# Patient Record
Sex: FEMALE | Race: WHITE | NOT HISPANIC OR LATINO | Employment: FULL TIME | URBAN - METROPOLITAN AREA
[De-identification: names, ages, dates, MRNs, and addresses within clinical notes are randomized per-mention and may not be internally consistent; named-entity substitution may affect disease eponyms.]

---

## 2018-03-22 ENCOUNTER — HOSPITAL ENCOUNTER (OUTPATIENT)
Dept: CARDIOLOGY | Facility: HOSPITAL | Age: 53
Discharge: HOME | End: 2018-03-22
Admitting: NURSE PRACTITIONER

## 2018-03-22 ENCOUNTER — OFFICE VISIT (OUTPATIENT)
Dept: FAMILY MEDICINE CLINIC | Facility: CLINIC | Age: 53
End: 2018-03-22

## 2018-03-22 VITALS
SYSTOLIC BLOOD PRESSURE: 112 MMHG | HEART RATE: 84 BPM | WEIGHT: 245 LBS | BODY MASS INDEX: 37.13 KG/M2 | TEMPERATURE: 97.6 F | HEIGHT: 68 IN | OXYGEN SATURATION: 98 % | DIASTOLIC BLOOD PRESSURE: 76 MMHG

## 2018-03-22 DIAGNOSIS — M79.661 RIGHT CALF PAIN: ICD-10-CM

## 2018-03-22 DIAGNOSIS — M54.31 SCIATICA OF RIGHT SIDE: Primary | ICD-10-CM

## 2018-03-22 LAB
BH CV LOWER VASCULAR LEFT COMMON FEMORAL AUGMENT: NORMAL
BH CV LOWER VASCULAR LEFT COMMON FEMORAL COMPETENT: NORMAL
BH CV LOWER VASCULAR LEFT COMMON FEMORAL COMPRESS: NORMAL
BH CV LOWER VASCULAR LEFT COMMON FEMORAL PHASIC: NORMAL
BH CV LOWER VASCULAR LEFT COMMON FEMORAL SPONT: NORMAL
BH CV LOWER VASCULAR RIGHT COMMON FEMORAL AUGMENT: NORMAL
BH CV LOWER VASCULAR RIGHT COMMON FEMORAL COMPETENT: NORMAL
BH CV LOWER VASCULAR RIGHT COMMON FEMORAL COMPRESS: NORMAL
BH CV LOWER VASCULAR RIGHT COMMON FEMORAL PHASIC: NORMAL
BH CV LOWER VASCULAR RIGHT COMMON FEMORAL SPONT: NORMAL
BH CV LOWER VASCULAR RIGHT DISTAL FEMORAL COMPRESS: NORMAL
BH CV LOWER VASCULAR RIGHT GASTRONEMIUS COMPRESS: NORMAL
BH CV LOWER VASCULAR RIGHT GREATER SAPH AK COMPRESS: NORMAL
BH CV LOWER VASCULAR RIGHT GREATER SAPH BK COMPRESS: NORMAL
BH CV LOWER VASCULAR RIGHT MID FEMORAL AUGMENT: NORMAL
BH CV LOWER VASCULAR RIGHT MID FEMORAL COMPETENT: NORMAL
BH CV LOWER VASCULAR RIGHT MID FEMORAL COMPRESS: NORMAL
BH CV LOWER VASCULAR RIGHT MID FEMORAL PHASIC: NORMAL
BH CV LOWER VASCULAR RIGHT MID FEMORAL SPONT: NORMAL
BH CV LOWER VASCULAR RIGHT PERONEAL COMPRESS: NORMAL
BH CV LOWER VASCULAR RIGHT POPLITEAL AUGMENT: NORMAL
BH CV LOWER VASCULAR RIGHT POPLITEAL COMPETENT: NORMAL
BH CV LOWER VASCULAR RIGHT POPLITEAL COMPRESS: NORMAL
BH CV LOWER VASCULAR RIGHT POPLITEAL PHASIC: NORMAL
BH CV LOWER VASCULAR RIGHT POPLITEAL SPONT: NORMAL
BH CV LOWER VASCULAR RIGHT POSTERIOR TIBIAL COMPRESS: NORMAL
BH CV LOWER VASCULAR RIGHT PROXIMAL FEMORAL COMPRESS: NORMAL
BH CV LOWER VASCULAR RIGHT SAPHENOFEMORAL JUNCTION AUGMENT: NORMAL
BH CV LOWER VASCULAR RIGHT SAPHENOFEMORAL JUNCTION COMPETENT: NORMAL
BH CV LOWER VASCULAR RIGHT SAPHENOFEMORAL JUNCTION COMPRESS: NORMAL
BH CV LOWER VASCULAR RIGHT SAPHENOFEMORAL JUNCTION PHASIC: NORMAL
BH CV LOWER VASCULAR RIGHT SAPHENOFEMORAL JUNCTION SPONT: NORMAL

## 2018-03-22 PROCEDURE — 99214 OFFICE O/P EST MOD 30 MIN: CPT | Performed by: NURSE PRACTITIONER

## 2018-03-22 PROCEDURE — 93971 EXTREMITY STUDY: CPT

## 2018-03-22 RX ORDER — METHYLPREDNISOLONE 4 MG/1
TABLET ORAL
Qty: 1 EACH | Refills: 0 | Status: SHIPPED | OUTPATIENT
Start: 2018-03-22 | End: 2019-02-11

## 2018-03-22 RX ORDER — CYCLOBENZAPRINE HCL 10 MG
10 TABLET ORAL 3 TIMES DAILY PRN
Qty: 30 TABLET | Refills: 0 | Status: SHIPPED | OUTPATIENT
Start: 2018-03-22 | End: 2019-02-11

## 2018-03-22 NOTE — PROGRESS NOTES
"Subjective   Kim Amezquita is a 52 y.o. female.     History of Present Illness   Patient presenting to the office today with right calf pain that started Tuesday night it is in the upper calf close to the back of the knee.  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  The pain has consistently gotten worse now is gone up to her right buttock she believes it could possibly be sciatica she's had sciatica once before in her life.  She's tried heat ice which haven't really helped very much.  No medications.  The following portions of the patient's history were reviewed and updated as appropriate: allergies, current medications, past social history and problem list.    Review of Systems   Musculoskeletal: Positive for back pain.   All other systems reviewed and are negative.      Objective   /76 (BP Location: Left arm, Patient Position: Sitting)   Pulse 84   Temp 97.6 °F (36.4 °C)   Ht 172.7 cm (67.99\")   Wt 111 kg (245 lb)   SpO2 98%   BMI 37.26 kg/m²   Physical Exam   Constitutional: She is oriented to person, place, and time. Vital signs are normal. She appears well-developed and well-nourished. No distress.   HENT:   Head: Normocephalic.   Cardiovascular: Normal rate, regular rhythm and normal heart sounds.    Pulmonary/Chest: Effort normal and breath sounds normal.   Neurological: She is alert and oriented to person, place, and time. Gait normal.   Psychiatric: She has a normal mood and affect. Her behavior is normal. Judgment and thought content normal.   Vitals reviewed.  On exam she does have a positive Homans sign on the right and also I do feel a rope in the back of the right upper calf.    Assessment/Plan   Problem List Items Addressed This Visit        Nervous and Auditory    Sciatica of right side - Primary    Relevant Medications    MethylPREDNISolone (MEDROL, CURTIS,) 4 MG tablet    cyclobenzaprine (FLEXERIL) 10 MG tablet    Right calf pain    Relevant Orders "    Duplex Venous Lower Extremity - Right CAR      Other Visit Diagnoses    None.     Going to go ahead and treat her for sciatica but also sending her for Doppler

## 2019-02-11 ENCOUNTER — OFFICE VISIT (OUTPATIENT)
Dept: FAMILY MEDICINE CLINIC | Facility: CLINIC | Age: 54
End: 2019-02-11

## 2019-02-11 VITALS
HEART RATE: 74 BPM | HEIGHT: 68 IN | SYSTOLIC BLOOD PRESSURE: 126 MMHG | OXYGEN SATURATION: 99 % | TEMPERATURE: 98.2 F | WEIGHT: 259.2 LBS | BODY MASS INDEX: 39.28 KG/M2 | DIASTOLIC BLOOD PRESSURE: 70 MMHG

## 2019-02-11 DIAGNOSIS — R00.0 TACHYCARDIA: ICD-10-CM

## 2019-02-11 DIAGNOSIS — R00.0 TACHYCARDIA: Primary | ICD-10-CM

## 2019-02-11 PROBLEM — M54.31 SCIATICA OF RIGHT SIDE: Status: RESOLVED | Noted: 2018-03-22 | Resolved: 2019-02-11

## 2019-02-11 PROCEDURE — 99213 OFFICE O/P EST LOW 20 MIN: CPT | Performed by: NURSE PRACTITIONER

## 2019-02-11 RX ORDER — ASPIRIN 81 MG/1
81 TABLET ORAL
COMMUNITY
Start: 2019-01-22 | End: 2019-03-05

## 2019-02-11 RX ORDER — ROPINIROLE 1 MG/1
TABLET, FILM COATED ORAL
Refills: 0 | COMMUNITY
Start: 2019-01-02 | End: 2022-04-12

## 2019-02-11 RX ORDER — METOPROLOL TARTRATE 50 MG/1
TABLET, FILM COATED ORAL
Qty: 30 TABLET | Refills: 0 | Status: SHIPPED | OUTPATIENT
Start: 2019-02-11 | End: 2019-02-11 | Stop reason: SDUPTHER

## 2019-02-11 RX ORDER — METOPROLOL TARTRATE 50 MG/1
TABLET, FILM COATED ORAL
Qty: 90 TABLET | Refills: 0 | Status: SHIPPED | OUTPATIENT
Start: 2019-02-11 | End: 2019-07-31

## 2019-02-11 RX ORDER — PROMETHAZINE HYDROCHLORIDE 12.5 MG/1
TABLET ORAL
Refills: 0 | COMMUNITY
Start: 2019-02-07 | End: 2019-07-31

## 2019-02-11 RX ORDER — OXYCODONE AND ACETAMINOPHEN 7.5; 325 MG/1; MG/1
TABLET ORAL
Refills: 0 | COMMUNITY
Start: 2019-01-31 | End: 2019-07-31

## 2019-02-11 NOTE — PROGRESS NOTES
"Subjective   Kim Amezquita is a 53 y.o. female.     History of Present Illness   Pt is going through PT for post op right knee replacement and at last three visits her pulse rate has been high. She has been having PT in her home but now is going to [facility].  Since going to their facility she has not had her pulse checked.  Monday her pulse was so high the therapist suggested she go to the ER but she refused to go.  She felt fine during all three PT sessions and did not have any SOA, palpitations, dizziness or blacking out. She does have PT this afternoon. No cough or leg pain other than knee do to surgery  The following portions of the patient's history were reviewed and updated as appropriate: allergies, current medications, past social history and problem list.    Review of Systems   Cardiovascular:        Tachycardia   All other systems reviewed and are negative.      Objective   /70 (BP Location: Left arm, Patient Position: Sitting)   Pulse 74   Temp 98.2 °F (36.8 °C)   Ht 172.7 cm (67.99\")   Wt 118 kg (259 lb 3.2 oz)   SpO2 99%   BMI 39.42 kg/m²   Physical Exam   Constitutional: She is oriented to person, place, and time. Vital signs are normal. She appears well-developed and well-nourished. No distress.   HENT:   Head: Normocephalic.   Cardiovascular: Normal rate, regular rhythm and normal heart sounds.   Pulmonary/Chest: Effort normal and breath sounds normal.   Neurological: She is alert and oriented to person, place, and time. Gait normal.   Psychiatric: She has a normal mood and affect. Her behavior is normal. Judgment and thought content normal.   Vitals reviewed.      Assessment/Plan      Diagnosis Plan   1. Tachycardia  metoprolol tartrate (LOPRESSOR) 50 MG tablet     Had been Toprol 50 mg for her to take 30 minutes prior to her physical therapy visits.  Today she's to have her pulse taken before starting physical therapy and then retirement through and his numbers are to be called to me " before she takes any medication.  Mata Lopez, APRN  2/11/2019

## 2019-02-12 ENCOUNTER — TELEPHONE (OUTPATIENT)
Dept: FAMILY MEDICINE CLINIC | Facility: CLINIC | Age: 54
End: 2019-02-12

## 2019-02-12 NOTE — TELEPHONE ENCOUNTER
Patient left message stating her heart rate before physical therapy today was 83 half way through it was 92. Last Monday before she started physical therapy her heart rate was 105. She picked up the beta blocker but has not started taking it yet

## 2019-03-07 ENCOUNTER — TRANSCRIBE ORDERS (OUTPATIENT)
Dept: PHYSICAL THERAPY | Facility: HOSPITAL | Age: 54
End: 2019-03-07

## 2019-03-07 DIAGNOSIS — Z96.651 H/O TOTAL KNEE REPLACEMENT, RIGHT: Primary | ICD-10-CM

## 2019-03-07 DIAGNOSIS — M25.562 CHRONIC PAIN OF LEFT KNEE: ICD-10-CM

## 2019-03-07 DIAGNOSIS — G89.29 CHRONIC PAIN OF LEFT KNEE: ICD-10-CM

## 2019-07-31 ENCOUNTER — OFFICE VISIT (OUTPATIENT)
Dept: FAMILY MEDICINE CLINIC | Facility: CLINIC | Age: 54
End: 2019-07-31

## 2019-07-31 VITALS
BODY MASS INDEX: 35.74 KG/M2 | HEIGHT: 68 IN | TEMPERATURE: 98.7 F | WEIGHT: 235.8 LBS | DIASTOLIC BLOOD PRESSURE: 74 MMHG | HEART RATE: 77 BPM | OXYGEN SATURATION: 99 % | SYSTOLIC BLOOD PRESSURE: 110 MMHG

## 2019-07-31 DIAGNOSIS — Z13.1 SCREENING FOR DIABETES MELLITUS: ICD-10-CM

## 2019-07-31 DIAGNOSIS — R53.83 FATIGUE, UNSPECIFIED TYPE: Primary | ICD-10-CM

## 2019-07-31 DIAGNOSIS — R06.83 SNORING: ICD-10-CM

## 2019-07-31 DIAGNOSIS — Z13.0 SCREENING FOR IRON DEFICIENCY ANEMIA: ICD-10-CM

## 2019-07-31 DIAGNOSIS — Z13.6 SCREENING FOR ISCHEMIC HEART DISEASE: ICD-10-CM

## 2019-07-31 DIAGNOSIS — Z83.49 FAMILY HISTORY OF THYROID DISEASE: ICD-10-CM

## 2019-07-31 PROCEDURE — 99214 OFFICE O/P EST MOD 30 MIN: CPT | Performed by: NURSE PRACTITIONER

## 2019-07-31 NOTE — PROGRESS NOTES
"Subjective   Kim Amezquita is a 54 y.o. female.     History of Present Illness   Pt presenting to the office today with concerns over fatigue.  She states that she is up every day between 4-5am and has been that way forever and historically has been doing ok with it until the recent last few months. She states that she sleep s very good lately and in the morning needs multiple cups of coffee to get her going which isn't normal.  Tired throughout the day and and will force herself to keep going and by night she is exhausted and ready for bed. She states that she is eating better lately and not eating fast foods.  She has been told that she snores. Did have a sleep study 6 years ago and it was normal.     The following portions of the patient's history were reviewed and updated as appropriate: allergies, current medications, past social history and problem list.    Review of Systems   Constitutional: Positive for fatigue.   All other systems reviewed and are negative.      Objective   /74 (BP Location: Left arm, Patient Position: Sitting)   Pulse 77   Temp 98.7 °F (37.1 °C)   Ht 172.7 cm (67.99\")   Wt 107 kg (235 lb 12.8 oz)   SpO2 99%   BMI 35.86 kg/m²   Physical Exam   Constitutional: She is oriented to person, place, and time. Vital signs are normal. She appears well-developed and well-nourished. No distress.   HENT:   Head: Normocephalic.   Cardiovascular: Normal rate, regular rhythm and normal heart sounds.   Pulmonary/Chest: Effort normal and breath sounds normal.   Neurological: She is alert and oriented to person, place, and time. Gait normal.   Psychiatric: She has a normal mood and affect. Her behavior is normal. Judgment and thought content normal.   Vitals reviewed.      Assessment/Plan      Diagnosis Plan   1. Fatigue, unspecified type  CBC & Differential    TSH    Vitamin B12 & Folate    Vitamin D 25 Hydroxy    Ambulatory Referral to Sleep Medicine   2. Family history of thyroid disease  " TSH   3. Screening for iron deficiency anemia  CBC & Differential   4. Screening for ischemic heart disease  Lipid Panel With LDL / HDL Ratio   5. Screening for diabetes mellitus  Comprehensive Metabolic Panel   6. Snoring  Ambulatory Referral to Sleep Medicine     Follow up after labs   Referral to sleep med  MICHAEL Koenig  7/31/2019

## 2019-08-01 LAB
25(OH)D3+25(OH)D2 SERPL-MCNC: 30.5 NG/ML (ref 30–100)
ALBUMIN SERPL-MCNC: 4.4 G/DL (ref 3.5–5.2)
ALBUMIN/GLOB SERPL: 1.6 G/DL
ALP SERPL-CCNC: 89 U/L (ref 39–117)
ALT SERPL-CCNC: 14 U/L (ref 1–33)
AST SERPL-CCNC: 15 U/L (ref 1–32)
BASOPHILS # BLD AUTO: 0.03 10*3/MM3 (ref 0–0.2)
BASOPHILS NFR BLD AUTO: 0.5 % (ref 0–1.5)
BILIRUB SERPL-MCNC: 0.3 MG/DL (ref 0.2–1.2)
BUN SERPL-MCNC: 14 MG/DL (ref 6–20)
BUN/CREAT SERPL: 20.6 (ref 7–25)
CALCIUM SERPL-MCNC: 9.2 MG/DL (ref 8.6–10.5)
CHLORIDE SERPL-SCNC: 103 MMOL/L (ref 98–107)
CHOLEST SERPL-MCNC: 192 MG/DL (ref 0–200)
CO2 SERPL-SCNC: 26.9 MMOL/L (ref 22–29)
CREAT SERPL-MCNC: 0.68 MG/DL (ref 0.57–1)
EOSINOPHIL # BLD AUTO: 0.05 10*3/MM3 (ref 0–0.4)
EOSINOPHIL NFR BLD AUTO: 0.8 % (ref 0.3–6.2)
ERYTHROCYTE [DISTWIDTH] IN BLOOD BY AUTOMATED COUNT: 13.3 % (ref 12.3–15.4)
FOLATE SERPL-MCNC: 8.29 NG/ML (ref 4.78–24.2)
GLOBULIN SER CALC-MCNC: 2.8 GM/DL
GLUCOSE SERPL-MCNC: 107 MG/DL (ref 65–99)
HCT VFR BLD AUTO: 43.7 % (ref 34–46.6)
HDLC SERPL-MCNC: 48 MG/DL (ref 40–60)
HGB BLD-MCNC: 13.4 G/DL (ref 12–15.9)
IMM GRANULOCYTES # BLD AUTO: 0.02 10*3/MM3 (ref 0–0.05)
IMM GRANULOCYTES NFR BLD AUTO: 0.3 % (ref 0–0.5)
LDLC SERPL CALC-MCNC: 118 MG/DL (ref 0–100)
LDLC/HDLC SERPL: 2.46 {RATIO}
LYMPHOCYTES # BLD AUTO: 1.49 10*3/MM3 (ref 0.7–3.1)
LYMPHOCYTES NFR BLD AUTO: 25 % (ref 19.6–45.3)
MCH RBC QN AUTO: 30.6 PG (ref 26.6–33)
MCHC RBC AUTO-ENTMCNC: 30.7 G/DL (ref 31.5–35.7)
MCV RBC AUTO: 99.8 FL (ref 79–97)
MONOCYTES # BLD AUTO: 0.39 10*3/MM3 (ref 0.1–0.9)
MONOCYTES NFR BLD AUTO: 6.5 % (ref 5–12)
NEUTROPHILS # BLD AUTO: 3.99 10*3/MM3 (ref 1.7–7)
NEUTROPHILS NFR BLD AUTO: 66.9 % (ref 42.7–76)
NRBC BLD AUTO-RTO: 0 /100 WBC (ref 0–0.2)
PLATELET # BLD AUTO: 277 10*3/MM3 (ref 140–450)
POTASSIUM SERPL-SCNC: 4.4 MMOL/L (ref 3.5–5.2)
PROT SERPL-MCNC: 7.2 G/DL (ref 6–8.5)
RBC # BLD AUTO: 4.38 10*6/MM3 (ref 3.77–5.28)
SODIUM SERPL-SCNC: 141 MMOL/L (ref 136–145)
TRIGL SERPL-MCNC: 129 MG/DL (ref 0–150)
TSH SERPL DL<=0.005 MIU/L-ACNC: 1.52 MIU/ML (ref 0.27–4.2)
VIT B12 SERPL-MCNC: 286 PG/ML (ref 211–946)
VLDLC SERPL CALC-MCNC: 25.8 MG/DL
WBC # BLD AUTO: 5.97 10*3/MM3 (ref 3.4–10.8)

## 2020-02-27 ENCOUNTER — OFFICE VISIT (OUTPATIENT)
Dept: FAMILY MEDICINE CLINIC | Facility: CLINIC | Age: 55
End: 2020-02-27

## 2020-02-27 VITALS
TEMPERATURE: 98.4 F | HEIGHT: 68 IN | BODY MASS INDEX: 37.83 KG/M2 | DIASTOLIC BLOOD PRESSURE: 84 MMHG | SYSTOLIC BLOOD PRESSURE: 158 MMHG | WEIGHT: 249.6 LBS | HEART RATE: 85 BPM | OXYGEN SATURATION: 98 %

## 2020-02-27 DIAGNOSIS — J32.9 SINUSITIS, UNSPECIFIED CHRONICITY, UNSPECIFIED LOCATION: Primary | ICD-10-CM

## 2020-02-27 PROCEDURE — 99213 OFFICE O/P EST LOW 20 MIN: CPT | Performed by: NURSE PRACTITIONER

## 2020-02-27 RX ORDER — METHYLPREDNISOLONE 4 MG/1
TABLET ORAL
COMMUNITY
Start: 2020-02-23 | End: 2020-03-03

## 2020-02-27 RX ORDER — AMOXICILLIN AND CLAVULANATE POTASSIUM 875; 125 MG/1; MG/1
TABLET, FILM COATED ORAL
COMMUNITY
Start: 2020-02-23 | End: 2020-04-16

## 2020-02-27 NOTE — PROGRESS NOTES
"Subjective   Kim Amezquita is a 54 y.o. female.     History of Present Illness   Upper Respiratory Infection: Patient complains of symptoms of a URI, possible sinusitis. Symptoms include congestion, cough, sore throat and swollen glands. Onset of symptoms was 10 days ago, unchanged since that time.   She is drinking plenty of fluids. Evaluation to date: last week. Treatment to date: antibiotics and steriods.    Patient went to the [facility] on Sunday she is on day 4 of Augmentin and she has 2 days left on a steroid pack she states that she is not any better.    The following portions of the patient's history were reviewed and updated as appropriate: allergies, current medications, past social history and problem list.    Review of Systems   Constitutional: Positive for fatigue.   HENT: Positive for congestion, ear pain, postnasal drip, rhinorrhea and sore throat.    Respiratory: Positive for cough.    All other systems reviewed and are negative.      Objective   /84 (BP Location: Right arm, Patient Position: Sitting)   Pulse 85   Temp 98.4 °F (36.9 °C)   Ht 172.7 cm (67.99\")   Wt 113 kg (249 lb 9.6 oz)   SpO2 98%   BMI 37.96 kg/m²   Physical Exam   Constitutional: She appears well-developed and well-nourished.   HENT:   Right Ear: A middle ear effusion is present.   Left Ear: A middle ear effusion is present.   Nose: Nose normal.   Mouth/Throat: Posterior oropharyngeal erythema present. Tonsils are 1+ on the right. Tonsils are 1+ on the left. No tonsillar exudate.   Cardiovascular: Normal rate and regular rhythm.   Pulmonary/Chest: Effort normal and breath sounds normal.   Lymphadenopathy:     She has cervical adenopathy.        Right cervical: Superficial cervical adenopathy present.        Left cervical: Superficial cervical adenopathy present.   Psychiatric: She has a normal mood and affect.       Assessment/Plan      Diagnosis Plan   1. Sinusitis, unspecified chronicity, unspecified location  "      I discussed with her at length that I could not give her a stronger antibiotic and she is on an appropriate one but typically we have to give it 4 full days before we see any differences.  I suggested over-the-counter medications for her to use and if she does get worse after she finishes the steroid that I could call her in another steroid she is to call the office.  Mata Lopez, APRN  2/27/2020

## 2020-03-02 ENCOUNTER — TELEPHONE (OUTPATIENT)
Dept: FAMILY MEDICINE CLINIC | Facility: CLINIC | Age: 55
End: 2020-03-02

## 2020-03-02 NOTE — TELEPHONE ENCOUNTER
Patient left message stating she seen tyler last Thursday was told if she wasn't feeling better to call and tyler would prescribe her prednisone

## 2020-03-03 RX ORDER — METHYLPREDNISOLONE 4 MG/1
TABLET ORAL
Qty: 21 TABLET | Refills: 0 | Status: SHIPPED | OUTPATIENT
Start: 2020-03-03 | End: 2020-04-16

## 2020-04-10 ENCOUNTER — OFFICE VISIT (OUTPATIENT)
Dept: FAMILY MEDICINE CLINIC | Facility: CLINIC | Age: 55
End: 2020-04-10

## 2020-04-10 DIAGNOSIS — J32.9 SINUSITIS, UNSPECIFIED CHRONICITY, UNSPECIFIED LOCATION: Primary | ICD-10-CM

## 2020-04-10 PROCEDURE — 99441 PR PHYS/QHP TELEPHONE EVALUATION 5-10 MIN: CPT | Performed by: NURSE PRACTITIONER

## 2020-04-10 RX ORDER — AZITHROMYCIN 250 MG/1
TABLET, FILM COATED ORAL
Qty: 6 TABLET | Refills: 0 | Status: SHIPPED | OUTPATIENT
Start: 2020-04-10 | End: 2020-04-16

## 2020-04-10 RX ORDER — PREDNISONE 20 MG/1
TABLET ORAL
Qty: 15 TABLET | Refills: 0 | Status: SHIPPED | OUTPATIENT
Start: 2020-04-10 | End: 2020-04-16

## 2020-04-10 NOTE — PROGRESS NOTES
You have chosen to receive care through a telephone visit today. Do you consent to use a telephone visit for your medical care today? Yes    Pt calling with concerns regarding facial pressure, congestion, ear pain and pressure, runny nose.  Right ear pain is worse than the left side.  She was on Augmentin and a Medrol Dosepak in the beginning of March that she went to the [facility] and got for the same symptoms a Medrol Dosepak did help significantly but the ear pain is back and it is much worse along with all the other sinus symptoms including upper tooth pain on the right side.    Sinusitis Z-Toby #1 as directed  Prednisone 20 mg #15 take 3 a day at the same time  She is to call back if this does not work.  Phone call lasted 10 minutes

## 2020-04-16 ENCOUNTER — OFFICE VISIT (OUTPATIENT)
Dept: FAMILY MEDICINE CLINIC | Facility: CLINIC | Age: 55
End: 2020-04-16

## 2020-04-16 DIAGNOSIS — J32.9 SINUSITIS, UNSPECIFIED CHRONICITY, UNSPECIFIED LOCATION: Primary | ICD-10-CM

## 2020-04-16 PROCEDURE — 99442 PR PHYS/QHP TELEPHONE EVALUATION 11-20 MIN: CPT | Performed by: NURSE PRACTITIONER

## 2020-04-16 NOTE — PROGRESS NOTES
You have chosen to receive care through a telephone visit. Do you consent to use a telephone visit for your medical care today? Yes    Pt has completed her zpack and her steroid. She is now having congestion and able to get some out her nose.  The pressure in her face is better but still there.  Her ears are better.  She is using flonase and zyrtec daily     Sinusitis  Cont with otc meds and add Sudafed.  Do this through the weekend and if worse or not better call Monday    Telephone call lasted 11 mins

## 2020-04-21 RX ORDER — DOXYCYCLINE HYCLATE 100 MG
100 TABLET ORAL 2 TIMES DAILY
Qty: 20 TABLET | Refills: 0 | Status: SHIPPED | OUTPATIENT
Start: 2020-04-21 | End: 2022-04-12

## 2021-03-24 ENCOUNTER — BULK ORDERING (OUTPATIENT)
Dept: CASE MANAGEMENT | Facility: OTHER | Age: 56
End: 2021-03-24

## 2021-03-24 DIAGNOSIS — Z23 IMMUNIZATION DUE: ICD-10-CM

## 2022-04-12 ENCOUNTER — OFFICE VISIT (OUTPATIENT)
Dept: FAMILY MEDICINE CLINIC | Facility: CLINIC | Age: 57
End: 2022-04-12

## 2022-04-12 VITALS
WEIGHT: 264 LBS | SYSTOLIC BLOOD PRESSURE: 124 MMHG | DIASTOLIC BLOOD PRESSURE: 86 MMHG | HEART RATE: 77 BPM | OXYGEN SATURATION: 98 % | BODY MASS INDEX: 40.01 KG/M2 | HEIGHT: 68 IN | TEMPERATURE: 97.1 F | RESPIRATION RATE: 16 BRPM

## 2022-04-12 DIAGNOSIS — J01.01 ACUTE RECURRENT MAXILLARY SINUSITIS: Primary | ICD-10-CM

## 2022-04-12 DIAGNOSIS — N89.8 VAGINAL ITCHING: ICD-10-CM

## 2022-04-12 LAB
BILIRUB BLD-MCNC: NEGATIVE MG/DL
CLARITY, POC: CLEAR
COLOR UR: YELLOW
EXPIRATION DATE: ABNORMAL
GLUCOSE UR STRIP-MCNC: NEGATIVE MG/DL
KETONES UR QL: NEGATIVE
LEUKOCYTE EST, POC: ABNORMAL
Lab: ABNORMAL
NITRITE UR-MCNC: NEGATIVE MG/ML
PH UR: 7 [PH] (ref 5–8)
PROT UR STRIP-MCNC: NEGATIVE MG/DL
RBC # UR STRIP: NEGATIVE /UL
SP GR UR: 1.02 (ref 1–1.03)
UROBILINOGEN UR QL: NORMAL

## 2022-04-12 PROCEDURE — 99214 OFFICE O/P EST MOD 30 MIN: CPT

## 2022-04-12 PROCEDURE — 81003 URINALYSIS AUTO W/O SCOPE: CPT

## 2022-04-12 RX ORDER — AMOXICILLIN AND CLAVULANATE POTASSIUM 875; 125 MG/1; MG/1
1 TABLET, FILM COATED ORAL 2 TIMES DAILY
Qty: 20 TABLET | Refills: 0 | Status: SHIPPED | OUTPATIENT
Start: 2022-04-12 | End: 2022-04-22

## 2022-04-14 LAB
A VAGINAE DNA VAG QL NAA+PROBE: NORMAL SCORE
BACTERIA UR CULT: NORMAL
BACTERIA UR CULT: NORMAL
BVAB2 DNA VAG QL NAA+PROBE: NORMAL SCORE
C ALBICANS DNA VAG QL NAA+PROBE: NEGATIVE
C GLABRATA DNA VAG QL NAA+PROBE: NEGATIVE
C TRACH DNA VAG QL NAA+PROBE: NEGATIVE
MEGA1 DNA VAG QL NAA+PROBE: NORMAL SCORE
N GONORRHOEA DNA VAG QL NAA+PROBE: NEGATIVE
T VAGINALIS DNA VAG QL NAA+PROBE: NEGATIVE

## 2023-09-15 ENCOUNTER — OFFICE VISIT (OUTPATIENT)
Dept: FAMILY MEDICINE CLINIC | Facility: CLINIC | Age: 58
End: 2023-09-15
Payer: COMMERCIAL

## 2023-09-15 VITALS
OXYGEN SATURATION: 95 % | HEART RATE: 85 BPM | TEMPERATURE: 98.4 F | BODY MASS INDEX: 41.91 KG/M2 | HEIGHT: 67 IN | WEIGHT: 267 LBS | SYSTOLIC BLOOD PRESSURE: 128 MMHG | DIASTOLIC BLOOD PRESSURE: 76 MMHG | RESPIRATION RATE: 16 BRPM

## 2023-09-15 DIAGNOSIS — Z12.31 ENCOUNTER FOR SCREENING MAMMOGRAM FOR MALIGNANT NEOPLASM OF BREAST: ICD-10-CM

## 2023-09-15 DIAGNOSIS — Z12.11 COLON CANCER SCREENING: ICD-10-CM

## 2023-09-15 DIAGNOSIS — B35.4 TINEA CORPORIS: Primary | ICD-10-CM

## 2023-09-15 PROBLEM — Z13.6 SCREENING FOR ISCHEMIC HEART DISEASE: Status: RESOLVED | Noted: 2019-07-31 | Resolved: 2023-09-15

## 2023-09-15 PROBLEM — K22.70 BARRETT'S ESOPHAGUS: Status: ACTIVE | Noted: 2023-09-15

## 2023-09-15 PROBLEM — K58.9 IBS (IRRITABLE BOWEL SYNDROME): Status: ACTIVE | Noted: 2023-09-15

## 2023-09-15 PROBLEM — M79.7 FIBROMYALGIA: Status: ACTIVE | Noted: 2018-01-01

## 2023-09-15 PROBLEM — E07.9 THYROID DYSFUNCTION: Status: ACTIVE | Noted: 2023-09-15

## 2023-09-15 PROBLEM — M54.16 RIGHT LUMBAR RADICULOPATHY: Status: ACTIVE | Noted: 2019-04-10

## 2023-09-15 NOTE — PATIENT INSTRUCTIONS
Keep sacrum clean and dry, wash with soap and water daily and dry off well; may need to wash twice a day if becomes sweaty. Recommend using otc Lamisil or equivalent twice a day prn for rash. Recommend keeping area cool, wearing breathable loose clothing.

## 2023-09-15 NOTE — PROGRESS NOTES
Subjective     Kim Amezquita is a 58 y.o.. female.     Patient here today to become established.     Patient stating she works as a  for operation for [employer].     Patient stating she has allergies, has been on injections in past.     Patient stating she last had a colonoscopy 10-12 yrs ago at [facility], and it was wnl.   Patient stating she thinks she has colon cancer history on her fathers side; but denies her father, her paternal uncles having colon cancer. Patient stating her father  of ulcer complications.     Patient stating she  has seen Dr. Barrera GYN in past, last seen ; LMP 2016; no menopausal symptoms at this time, no childbirth, one D&C.     Patient stating she sees an acupuncturist every 2 weeks    Patient c/o red and swollen area to sacrum; states she has been using Lubriderm and neosporin to area.       The following portions of the patient's history were reviewed and updated as appropriate: allergies, current medications, past family history, past medical history, past social history, past surgical history and problem list.    Past Medical History:   Diagnosis Date    Schmitz's esophagus     declines medication    Breast cyst     DDD (degenerative disc disease), cervical     Deviated septum     Disc degeneration, lumbar     Dry mouth     GERD (gastroesophageal reflux disease)     Herniated disc, cervical     C5-C6, 10/2006, Dr. Maykel Manzano    IBS (irritable bowel syndrome)     Kidney stones     Migraines     Ovarian cyst     biopsy done 2010 negative, Dr. Stacy Thomas    Restless leg syndrome     TMJ (dislocation of temporomandibular joint)        Past Surgical History:   Procedure Laterality Date    CERVICAL DISCECTOMY LAMINECTOMY DECOMPRESSION POSTERIOR FUSION WITH INSTRUMENTATION      C5-C6, Dr. Pablo Pal    DILATATION AND CURETTAGE      3/2001    HERNIA REPAIR      1969 (4years old)    MENISCECTOMY Right     10/2006, Dr. Cyrus Grimaldo     "REPLACEMENT TOTAL KNEE Right     9/2014, Dr. Guadalupe    REPLACEMENT TOTAL KNEE      2019       Review of Systems   Constitutional: Negative.    Respiratory: Negative.     Cardiovascular: Negative.    Skin:         red and swollen area to sacrum     Allergies   Allergen Reactions    Meperidine     Meperidine Hcl Other (See Comments)       Objective     Vitals:    09/15/23 1032   BP: 128/76   Pulse: 85   Resp: 16   Temp: 98.4 °F (36.9 °C)   SpO2: 95%   Weight: 121 kg (267 lb)   Height: 170.2 cm (67.01\")     Body mass index is 41.81 kg/m².    Physical Exam  Vitals reviewed.   HENT:      Head: Normocephalic.   Eyes:      Pupils: Pupils are equal, round, and reactive to light.   Cardiovascular:      Rate and Rhythm: Normal rate and regular rhythm.   Pulmonary:      Effort: Pulmonary effort is normal.      Breath sounds: Normal breath sounds.   Musculoskeletal:         General: Normal range of motion.   Skin:     General: Skin is warm and dry.      Comments: Moderate size of dark pink macular closed tissue to sacrum   Neurological:      Mental Status: She is alert and oriented to person, place, and time.   Psychiatric:         Behavior: Behavior normal.         Current Outpatient Medications:     acetaminophen (TYLENOL) 500 MG tablet, Take 1 tablet by mouth Every 6 (Six) Hours As Needed for Mild Pain., Disp: 30 tablet, Rfl: 0    psyllium (METAMUCIL) 58.6 % packet, Take 1 packet by mouth Daily., Disp: , Rfl:         Diagnoses and all orders for this visit:    1. Tinea corporis (Primary)    2. Encounter for screening mammogram for malignant neoplasm of breast  -     Mammo Screening Bilateral With CAD; Future    3. Colon cancer screening  -     Cologuard - Stool, Per Rectum; Future        Patient Instructions   Keep sacrum clean and dry, wash with soap and water daily and dry off well; may need to wash twice a day if becomes sweaty. Recommend using otc Lamisil or equivalent twice a day prn for rash. Recommend keeping area " cool, wearing breathable loose clothing.     Return if symptoms worsen or fail to improve, for fasting labs, Annual physical.

## 2023-09-20 ENCOUNTER — TRANSCRIBE ORDERS (OUTPATIENT)
Dept: ADMINISTRATIVE | Facility: HOSPITAL | Age: 58
End: 2023-09-20
Payer: COMMERCIAL

## 2023-09-20 DIAGNOSIS — Z12.31 VISIT FOR SCREENING MAMMOGRAM: Primary | ICD-10-CM

## 2023-09-27 ENCOUNTER — TELEPHONE (OUTPATIENT)
Dept: FAMILY MEDICINE CLINIC | Facility: CLINIC | Age: 58
End: 2023-09-27
Payer: COMMERCIAL

## 2023-09-27 ENCOUNTER — HOSPITAL ENCOUNTER (OUTPATIENT)
Dept: MAMMOGRAPHY | Facility: HOSPITAL | Age: 58
Discharge: HOME | End: 2023-09-27
Admitting: NURSE PRACTITIONER
Payer: COMMERCIAL

## 2023-09-27 DIAGNOSIS — Z12.31 VISIT FOR SCREENING MAMMOGRAM: ICD-10-CM

## 2023-09-27 PROCEDURE — 77063 BREAST TOMOSYNTHESIS BI: CPT

## 2023-09-27 PROCEDURE — 77067 SCR MAMMO BI INCL CAD: CPT

## 2023-09-27 NOTE — TELEPHONE ENCOUNTER
----- Message from MICHAEL Carrera sent at 9/27/2023  3:53 PM EDT -----  Please call Patient and inform her that her mammogram was negative. thanks

## 2023-09-28 DIAGNOSIS — E55.9 VITAMIN D DEFICIENCY: ICD-10-CM

## 2023-09-28 DIAGNOSIS — Z13.1 SCREENING FOR DIABETES MELLITUS: ICD-10-CM

## 2023-09-28 DIAGNOSIS — Z00.00 ANNUAL PHYSICAL EXAM: Primary | ICD-10-CM

## 2023-09-28 DIAGNOSIS — E07.9 THYROID DYSFUNCTION: ICD-10-CM

## 2023-09-29 LAB
25(OH)D3+25(OH)D2 SERPL-MCNC: 28.5 NG/ML (ref 30–100)
ALBUMIN SERPL-MCNC: 4.6 G/DL (ref 3.5–5.2)
ALBUMIN/GLOB SERPL: 1.6 G/DL
ALP SERPL-CCNC: 81 U/L (ref 39–117)
ALT SERPL-CCNC: 17 U/L (ref 1–33)
APPEARANCE UR: CLEAR
AST SERPL-CCNC: 15 U/L (ref 1–32)
BACTERIA #/AREA URNS HPF: ABNORMAL /HPF
BASOPHILS # BLD AUTO: 0.03 10*3/MM3 (ref 0–0.2)
BASOPHILS NFR BLD AUTO: 0.5 % (ref 0–1.5)
BILIRUB SERPL-MCNC: 0.3 MG/DL (ref 0–1.2)
BILIRUB UR QL STRIP: NEGATIVE
BUN SERPL-MCNC: 13 MG/DL (ref 6–20)
BUN/CREAT SERPL: 21 (ref 7–25)
CALCIUM SERPL-MCNC: 9.3 MG/DL (ref 8.6–10.5)
CASTS URNS MICRO: ABNORMAL
CHLORIDE SERPL-SCNC: 105 MMOL/L (ref 98–107)
CHOLEST SERPL-MCNC: 212 MG/DL (ref 0–200)
CO2 SERPL-SCNC: 28.1 MMOL/L (ref 22–29)
COLOR UR: YELLOW
CREAT SERPL-MCNC: 0.62 MG/DL (ref 0.57–1)
EGFRCR SERPLBLD CKD-EPI 2021: 103.4 ML/MIN/1.73
EOSINOPHIL # BLD AUTO: 0.07 10*3/MM3 (ref 0–0.4)
EOSINOPHIL NFR BLD AUTO: 1.2 % (ref 0.3–6.2)
EPI CELLS #/AREA URNS HPF: ABNORMAL /HPF
ERYTHROCYTE [DISTWIDTH] IN BLOOD BY AUTOMATED COUNT: 12.8 % (ref 12.3–15.4)
GLOBULIN SER CALC-MCNC: 2.8 GM/DL
GLUCOSE SERPL-MCNC: 104 MG/DL (ref 65–99)
GLUCOSE UR QL STRIP: NEGATIVE
HBA1C MFR BLD: 5.5 % (ref 4.8–5.6)
HCT VFR BLD AUTO: 39.7 % (ref 34–46.6)
HDLC SERPL-MCNC: 47 MG/DL (ref 40–60)
HGB BLD-MCNC: 13.5 G/DL (ref 12–15.9)
HGB UR QL STRIP: NEGATIVE
IMM GRANULOCYTES # BLD AUTO: 0.01 10*3/MM3 (ref 0–0.05)
IMM GRANULOCYTES NFR BLD AUTO: 0.2 % (ref 0–0.5)
KETONES UR QL STRIP: NEGATIVE
LDLC SERPL CALC-MCNC: 143 MG/DL (ref 0–100)
LDLC/HDLC SERPL: 3 {RATIO}
LEUKOCYTE ESTERASE UR QL STRIP: ABNORMAL
LYMPHOCYTES # BLD AUTO: 1.73 10*3/MM3 (ref 0.7–3.1)
LYMPHOCYTES NFR BLD AUTO: 29 % (ref 19.6–45.3)
MCH RBC QN AUTO: 31.5 PG (ref 26.6–33)
MCHC RBC AUTO-ENTMCNC: 34 G/DL (ref 31.5–35.7)
MCV RBC AUTO: 92.8 FL (ref 79–97)
MONOCYTES # BLD AUTO: 0.38 10*3/MM3 (ref 0.1–0.9)
MONOCYTES NFR BLD AUTO: 6.4 % (ref 5–12)
MUCOUS THREADS URNS QL MICRO: ABNORMAL /HPF
NEUTROPHILS # BLD AUTO: 3.75 10*3/MM3 (ref 1.7–7)
NEUTROPHILS NFR BLD AUTO: 62.7 % (ref 42.7–76)
NITRITE UR QL STRIP: NEGATIVE
NRBC BLD AUTO-RTO: 0 /100 WBC (ref 0–0.2)
PH UR STRIP: 6 [PH] (ref 5–8)
PLATELET # BLD AUTO: 263 10*3/MM3 (ref 140–450)
POTASSIUM SERPL-SCNC: 4.6 MMOL/L (ref 3.5–5.2)
PROT SERPL-MCNC: 7.4 G/DL (ref 6–8.5)
PROT UR QL STRIP: ABNORMAL
RBC # BLD AUTO: 4.28 10*6/MM3 (ref 3.77–5.28)
RBC #/AREA URNS HPF: ABNORMAL /HPF
SODIUM SERPL-SCNC: 141 MMOL/L (ref 136–145)
SP GR UR STRIP: 1.02 (ref 1–1.03)
TRIGL SERPL-MCNC: 120 MG/DL (ref 0–150)
TSH SERPL DL<=0.005 MIU/L-ACNC: 2.26 UIU/ML (ref 0.27–4.2)
URNS CMNT MICRO: ABNORMAL
UROBILINOGEN UR STRIP-MCNC: ABNORMAL MG/DL
VLDLC SERPL CALC-MCNC: 22 MG/DL (ref 5–40)
WBC # BLD AUTO: 5.97 10*3/MM3 (ref 3.4–10.8)
WBC #/AREA URNS HPF: ABNORMAL /HPF

## 2023-10-02 ENCOUNTER — OFFICE VISIT (OUTPATIENT)
Dept: FAMILY MEDICINE CLINIC | Facility: CLINIC | Age: 58
End: 2023-10-02
Payer: COMMERCIAL

## 2023-10-02 VITALS
RESPIRATION RATE: 16 BRPM | BODY MASS INDEX: 42.06 KG/M2 | DIASTOLIC BLOOD PRESSURE: 74 MMHG | WEIGHT: 268 LBS | SYSTOLIC BLOOD PRESSURE: 126 MMHG | TEMPERATURE: 98.8 F | HEART RATE: 80 BPM | HEIGHT: 67 IN | OXYGEN SATURATION: 96 %

## 2023-10-02 DIAGNOSIS — Z00.00 ANNUAL PHYSICAL EXAM: Primary | ICD-10-CM

## 2023-10-02 DIAGNOSIS — G47.9 SLEEP DISTURBANCE: ICD-10-CM

## 2023-10-02 DIAGNOSIS — R25.1 TREMOR: ICD-10-CM

## 2023-10-02 DIAGNOSIS — E55.9 VITAMIN D DEFICIENCY: ICD-10-CM

## 2023-10-02 DIAGNOSIS — E78.2 MIXED HYPERLIPIDEMIA: ICD-10-CM

## 2023-10-02 NOTE — PATIENT INSTRUCTIONS
Hyperlipidemia: recommend eating a heart healthy low fat diet, increase water intake, and start exercising 2-3 times a week for 30 minutes at a time. Discussed giving Patient 6 months to work on diet and then rto for fasting labs follow up by appt; if no improvement discussed atorvastatin.     Tremor: fine tremor to imelda. Hands, ordering add on lab of B12 for further eval.; discussed propranolol, will discuss further in 6 months.    Vitamin D deficiency: recommend otc Vitamin D3 1000 units with calcium daily.    Sleep disturbance: Stop all electronics 1-2 hours before sleep; Start a bedtime routine that allows for relaxation before bedtime; Recommend relaxation to be shower/bath, reading a book, doing crossword puzzle, puzzles, drawing, ect.; Recommend decreasing to eliminate caffeine in diet and no caffeine in afternoon/evening.

## 2023-10-02 NOTE — PROGRESS NOTES
"Subjective   Kim Amezquita is a 58 y.o. female. Patient is here today for   Chief Complaint   Patient presents with    Annual Exam     Lab f/u          Vitals:    10/02/23 0803   BP: 126/74   Pulse: 80   Resp: 16   Temp: 98.8 °F (37.1 °C)   SpO2: 96%   Weight: 122 kg (268 lb)   Height: 170.2 cm (67.01\")      Body mass index is 41.96 kg/m².    Class 3 Severe Obesity (BMI >=40). Obesity-related health conditions include the following: dyslipidemias. Obesity is unchanged. BMI is is above average; BMI management plan is completed. We discussed portion control and increasing exercise.     The following portions of the patient's history were reviewed and updated as appropriate: allergies, current medications, past family history, past medical history, past social history, past surgical history and problem list.    Past Medical History:   Diagnosis Date    Schmitz's esophagus     declines medication    Breast cyst     DDD (degenerative disc disease), cervical     Deviated septum     Disc degeneration, lumbar     Dry mouth     GERD (gastroesophageal reflux disease)     Herniated disc, cervical     C5-C6, 10/2006, Dr. Maykel Manzano    IBS (irritable bowel syndrome)     Kidney stones     Migraines     Ovarian cyst     biopsy done 8/2010 negative, Dr. Stacy Thomas    Restless leg syndrome     TMJ (dislocation of temporomandibular joint)       Allergies   Allergen Reactions    Meperidine     Meperidine Hcl Other (See Comments)      Social History     Socioeconomic History    Marital status: Single   Tobacco Use    Smoking status: Never    Smokeless tobacco: Never   Substance and Sexual Activity    Alcohol use: No    Drug use: No    Sexual activity: Yes     Partners: Male        Current Outpatient Medications:     acetaminophen (TYLENOL) 500 MG tablet, Take 1 tablet by mouth Every 6 (Six) Hours As Needed for Mild Pain., Disp: 30 tablet, Rfl: 0    psyllium (METAMUCIL) 58.6 % packet, Take 1 packet by mouth Daily., Disp: , Rfl: "      Last Mammogram: 23 negative    Last Cologaurd ordered on 23, awaiting processing    ECG today in office: SR, vent rate 68, ND int 180, QRS 94    Objective   History of Present Illness  Patient here today for annual physcial.    Patient stating she last had a colonoscopy 10-12 yrs ago at [facility], and it was wnl.      Patient stating she has seen Dr. Barrera GYN in past, last seen ; LMP 2016; no menopausal symptoms at this time, no childbirth, one D&C.      Patient stating she sees an acupuncturist every 2 weeks     Per Patient 3/2007 [facility], high risk for Glaucoma; 2007 Sleep study done which was negative.   Kim A Alirio 58 y.o. female who presents for an Annual Wellness Visit.  she has a history of   Patient Active Problem List   Diagnosis    Abnormal menses    Allergic rhinitis    Anxiety    FOM (frequency of micturition)    Gonalgia    Affective disorder    Abnormal findings-skull or head    Patella-femoral syndrome    Sinusitis    Right calf pain    Tachycardia    Fatigue    Family history of thyroid disease    Screening for diabetes mellitus    Snoring    Vaginal itching    Schmitz's esophagus    Fibromyalgia    Migraine    Osteoarthritis    Right lumbar radiculopathy    IBS (irritable bowel syndrome)    Thyroid dysfunction    Tremor    Mixed hyperlipidemia    Sleep disturbance    Vitamin D deficiency   .  she has been doing well with new interval problems.  Labs results discussed in detail with the patient.  Plan to update vaccines if needed today.    Health Habits:  Dental Exam. up to date; 2022  Eye Exam. up to date; 2 yrs  Exercise: 0 times/week.  Current exercise activities include: none  Diet: healthy  Water: vitamin water 2-6 bottles a day    Preventative counseling  Discussed face to face the importance of healthy diet and exercise.     PHQ-9 Depression Screenin/15/23  Little interest or pleasure in doing things?  0   Feeling down, depressed, or  hopeless?  0   Trouble falling or staying asleep, or sleeping too much?     Feeling tired or having little energy?     Poor appetite or overeating?     Feeling bad about yourself - or that you are a failure or have let yourself or your family down?     Trouble concentrating on things, such as reading the newspaper or watching television?     Moving or speaking so slowly that other people could have noticed? Or the opposite - being so fidgety or restless that you have been moving around a lot more than usual?     Thoughts that you would be better off dead, or of hurting yourself in some way?     PHQ-9 Total Score  0   If you checked off any problems, how difficult have these problems made it for you to do your work, take care of things at home, or get along with other people?          Recent Results (from the past 336 hour(s))   CBC & Differential    Collection Time: 09/28/23  8:02 AM    Specimen: Blood   Result Value Ref Range    WBC 5.97 3.40 - 10.80 10*3/mm3    RBC 4.28 3.77 - 5.28 10*6/mm3    Hemoglobin 13.5 12.0 - 15.9 g/dL    Hematocrit 39.7 34.0 - 46.6 %    MCV 92.8 79.0 - 97.0 fL    MCH 31.5 26.6 - 33.0 pg    MCHC 34.0 31.5 - 35.7 g/dL    RDW 12.8 12.3 - 15.4 %    Platelets 263 140 - 450 10*3/mm3    Neutrophil Rel % 62.7 42.7 - 76.0 %    Lymphocyte Rel % 29.0 19.6 - 45.3 %    Monocyte Rel % 6.4 5.0 - 12.0 %    Eosinophil Rel % 1.2 0.3 - 6.2 %    Basophil Rel % 0.5 0.0 - 1.5 %    Neutrophils Absolute 3.75 1.70 - 7.00 10*3/mm3    Lymphocytes Absolute 1.73 0.70 - 3.10 10*3/mm3    Monocytes Absolute 0.38 0.10 - 0.90 10*3/mm3    Eosinophils Absolute 0.07 0.00 - 0.40 10*3/mm3    Basophils Absolute 0.03 0.00 - 0.20 10*3/mm3    Immature Granulocyte Rel % 0.2 0.0 - 0.5 %    Immature Grans Absolute 0.01 0.00 - 0.05 10*3/mm3    nRBC 0.0 0.0 - 0.2 /100 WBC   Comprehensive Metabolic Panel    Collection Time: 09/28/23  8:02 AM    Specimen: Blood   Result Value Ref Range    Glucose 104 (H) 65 - 99 mg/dL    BUN 13 6 - 20  mg/dL    Creatinine 0.62 0.57 - 1.00 mg/dL    EGFR Result 103.4 >60.0 mL/min/1.73    BUN/Creatinine Ratio 21.0 7.0 - 25.0    Sodium 141 136 - 145 mmol/L    Potassium 4.6 3.5 - 5.2 mmol/L    Chloride 105 98 - 107 mmol/L    Total CO2 28.1 22.0 - 29.0 mmol/L    Calcium 9.3 8.6 - 10.5 mg/dL    Total Protein 7.4 6.0 - 8.5 g/dL    Albumin 4.6 3.5 - 5.2 g/dL    Globulin 2.8 gm/dL    A/G Ratio 1.6 g/dL    Total Bilirubin 0.3 0.0 - 1.2 mg/dL    Alkaline Phosphatase 81 39 - 117 U/L    AST (SGOT) 15 1 - 32 U/L    ALT (SGPT) 17 1 - 33 U/L   Lipid Panel With LDL / HDL Ratio    Collection Time: 09/28/23  8:02 AM    Specimen: Blood   Result Value Ref Range    Total Cholesterol 212 (H) 0 - 200 mg/dL    Triglycerides 120 0 - 150 mg/dL    HDL Cholesterol 47 40 - 60 mg/dL    VLDL Cholesterol Get 22 5 - 40 mg/dL    LDL Chol Calc (NIH) 143 (H) 0 - 100 mg/dL    LDL/HDL RATIO 3.00    TSH Rfx On Abnormal To Free T4    Collection Time: 09/28/23  8:02 AM    Specimen: Blood   Result Value Ref Range    TSH 2.260 0.270 - 4.200 uIU/mL   Vitamin D,25-Hydroxy    Collection Time: 09/28/23  8:02 AM    Specimen: Blood   Result Value Ref Range    25 Hydroxy, Vitamin D 28.5 (L) 30.0 - 100.0 ng/ml   Hemoglobin A1c    Collection Time: 09/28/23  8:02 AM    Specimen: Blood   Result Value Ref Range    Hemoglobin A1C 5.50 4.80 - 5.60 %   Urinalysis With Microscopic - Urine, Clean Catch    Collection Time: 09/28/23  8:02 AM    Specimen: Urine, Clean Catch   Result Value Ref Range    Specific Gravity, UA 1.025 1.005 - 1.030    pH, UA 6.0 5.0 - 8.0    Color, UA Yellow     Appearance, UA Clear Clear    Leukocytes, UA See below: (A) Negative    Protein Trace (A) Negative    Glucose, UA Negative Negative    Ketones Negative Negative    Blood, UA Negative Negative    Bilirubin, UA Negative Negative    Urobilinogen, UA Comment     Nitrite, UA Negative Negative   Microscopic Examination -    Collection Time: 09/28/23  8:02 AM   Result Value Ref Range    WBC, UA 6-12  (A) /HPF    RBC, UA 0-2 /HPF    Epithelial Cells (non renal) 3-6 (A) /HPF    Cast Type Comment     Mucus, UA See below: (A) /HPF    Bacteria, UA Comment None Seen /HPF    UA Comment Comment         Review of Systems   Constitutional: Negative.    Respiratory: Negative.     Cardiovascular: Negative.    Gastrointestinal: Negative.    Genitourinary: Negative.    Musculoskeletal: Negative.    Neurological: Negative.    Psychiatric/Behavioral:  Positive for sleep disturbance.      Physical Exam  Constitutional:       Appearance: Normal appearance. She is well-developed.   HENT:      Head: Normocephalic and atraumatic.      Right Ear: Tympanic membrane normal. Tympanic membrane is not erythematous.      Left Ear: Tympanic membrane normal. Tympanic membrane is not erythematous.      Nose: Nose normal.   Eyes:      Conjunctiva/sclera: Conjunctivae normal.      Pupils: Pupils are equal, round, and reactive to light.   Neck:      Thyroid: No thyromegaly.      Vascular: No carotid bruit.      Trachea: No tracheal deviation.   Cardiovascular:      Rate and Rhythm: Normal rate and regular rhythm.      Pulses: Normal pulses.      Heart sounds: Normal heart sounds. No murmur heard.  Pulmonary:      Effort: No accessory muscle usage or respiratory distress.      Breath sounds: Normal breath sounds. No stridor. No decreased breath sounds, wheezing, rhonchi or rales.   Abdominal:      General: Bowel sounds are normal. There is no distension.      Palpations: Abdomen is soft. Abdomen is not rigid. There is no mass.      Tenderness: There is no abdominal tenderness. There is no guarding or rebound.      Hernia: No hernia is present.   Musculoskeletal:         General: Normal range of motion.      Cervical back: Normal range of motion and neck supple.   Lymphadenopathy:      Cervical: No cervical adenopathy.   Skin:     General: Skin is warm and dry.      Capillary Refill: Capillary refill takes less than 2 seconds.   Neurological:       Mental Status: She is alert and oriented to person, place, and time.      Cranial Nerves: No cranial nerve deficit.      Sensory: No sensory deficit.      Motor: Tremor (imelda. hands) present. No abnormal muscle tone.      Coordination: Coordination normal.   Psychiatric:         Mood and Affect: Mood normal.         Speech: Speech normal.         Behavior: Behavior normal.       ASSESSMENT       Problems Addressed this Visit          Cardiac and Vasculature    Mixed hyperlipidemia    Relevant Orders    ECG 12 Lead (Completed)       Endocrine and Metabolic    Vitamin D deficiency       Neuro    Tremor       Sleep    Sleep disturbance     Other Visit Diagnoses       Annual physical exam    -  Primary          Diagnoses         Codes Comments    Annual physical exam    -  Primary ICD-10-CM: Z00.00  ICD-9-CM: V70.0     Mixed hyperlipidemia     ICD-10-CM: E78.2  ICD-9-CM: 272.2     Tremor     ICD-10-CM: R25.1  ICD-9-CM: 781.0 fine imelda. hands    Vitamin D deficiency     ICD-10-CM: E55.9  ICD-9-CM: 268.9     Sleep disturbance     ICD-10-CM: G47.9  ICD-9-CM: 780.50           PLAN    Patient Instructions   Hyperlipidemia: recommend eating a heart healthy low fat diet, increase water intake, and start exercising 2-3 times a week for 30 minutes at a time. Discussed giving Patient 6 months to work on diet and then rto for fasting labs follow up by appt; if no improvement discussed atorvastatin.     Tremor: fine tremor to imelda. Hands, ordering add on lab of B12 for further eval.; discussed propranolol, will discuss further in 6 months.    Vitamin D deficiency: recommend otc Vitamin D3 1000 units with calcium daily.    Sleep disturbance: Stop all electronics 1-2 hours before sleep; Start a bedtime routine that allows for relaxation before bedtime; Recommend relaxation to be shower/bath, reading a book, doing crossword puzzle, puzzles, drawing, ect.; Recommend decreasing to eliminate caffeine in diet and no caffeine in  afternoon/evening.       Return for fasting labs in 6 months, recheck in 6 months.

## 2023-10-03 ENCOUNTER — TELEPHONE (OUTPATIENT)
Dept: FAMILY MEDICINE CLINIC | Facility: CLINIC | Age: 58
End: 2023-10-03
Payer: COMMERCIAL

## 2023-10-03 NOTE — TELEPHONE ENCOUNTER
----- Message from MICHAEL Carrera sent at 10/3/2023  9:22 AM EDT -----  Please call Patient and inform her that her cologaurd came back negative. thanks

## 2023-11-02 ENCOUNTER — OFFICE VISIT (OUTPATIENT)
Dept: FAMILY MEDICINE CLINIC | Facility: CLINIC | Age: 58
End: 2023-11-02
Payer: COMMERCIAL

## 2023-11-02 VITALS
OXYGEN SATURATION: 97 % | BODY MASS INDEX: 42.06 KG/M2 | WEIGHT: 268 LBS | RESPIRATION RATE: 16 BRPM | DIASTOLIC BLOOD PRESSURE: 78 MMHG | HEART RATE: 94 BPM | TEMPERATURE: 98.8 F | SYSTOLIC BLOOD PRESSURE: 130 MMHG | HEIGHT: 67 IN

## 2023-11-02 DIAGNOSIS — J01.00 ACUTE MAXILLARY SINUSITIS, RECURRENCE NOT SPECIFIED: ICD-10-CM

## 2023-11-02 DIAGNOSIS — J02.9 ACUTE PHARYNGITIS, UNSPECIFIED ETIOLOGY: ICD-10-CM

## 2023-11-02 DIAGNOSIS — R59.1 LYMPHADENOPATHY: ICD-10-CM

## 2023-11-02 DIAGNOSIS — J01.00 ACUTE MAXILLARY SINUSITIS, RECURRENCE NOT SPECIFIED: Primary | ICD-10-CM

## 2023-11-02 DIAGNOSIS — G25.2 COARSE TREMORS: ICD-10-CM

## 2023-11-02 DIAGNOSIS — R05.9 COUGH, UNSPECIFIED TYPE: ICD-10-CM

## 2023-11-02 LAB
EXPIRATION DATE: NORMAL
EXPIRATION DATE: NORMAL
FLUAV AG UPPER RESP QL IA.RAPID: NOT DETECTED
FLUBV AG UPPER RESP QL IA.RAPID: NOT DETECTED
INTERNAL CONTROL: NORMAL
INTERNAL CONTROL: NORMAL
Lab: NORMAL
Lab: NORMAL
S PYO AG THROAT QL: NEGATIVE
SARS-COV-2 AG UPPER RESP QL IA.RAPID: NOT DETECTED

## 2023-11-02 PROCEDURE — 87880 STREP A ASSAY W/OPTIC: CPT | Performed by: NURSE PRACTITIONER

## 2023-11-02 PROCEDURE — 87428 SARSCOV & INF VIR A&B AG IA: CPT | Performed by: NURSE PRACTITIONER

## 2023-11-02 PROCEDURE — 99214 OFFICE O/P EST MOD 30 MIN: CPT | Performed by: NURSE PRACTITIONER

## 2023-11-02 RX ORDER — FLUTICASONE PROPIONATE 50 MCG
2 SPRAY, SUSPENSION (ML) NASAL DAILY
Qty: 16 G | Refills: 0 | Status: SHIPPED | OUTPATIENT
Start: 2023-11-02 | End: 2023-11-16

## 2023-11-02 RX ORDER — AZELASTINE 1 MG/ML
1 SPRAY, METERED NASAL 2 TIMES DAILY
Qty: 1 EACH | Refills: 0 | Status: SHIPPED | OUTPATIENT
Start: 2023-11-02 | End: 2023-11-02

## 2023-11-02 RX ORDER — CEFDINIR 300 MG/1
300 CAPSULE ORAL 2 TIMES DAILY
Qty: 14 CAPSULE | Refills: 0 | Status: SHIPPED | OUTPATIENT
Start: 2023-11-02 | End: 2023-11-09

## 2023-11-02 RX ORDER — AZELASTINE 1 MG/ML
SPRAY, METERED NASAL
Qty: 90 ML | Refills: 0 | Status: SHIPPED | OUTPATIENT
Start: 2023-11-02

## 2023-11-02 NOTE — TELEPHONE ENCOUNTER
Rx Refill Note  Requested Prescriptions     Pending Prescriptions Disp Refills    azelastine (ASTELIN) 0.1 % nasal spray [Pharmacy Med Name: AZELASTINE 0.1%(137MCG) NASAL-200SP] 90 mL      Sig: INSTILL 1 SPRAY IN EACH NOSTRIL AS DIRECTED TWICE DAILY FOR 14 DAYS      Last office visit with prescribing clinician: 11/2/2023   Last telemedicine visit with prescribing clinician: Visit date not found   Next office visit with prescribing clinician: 4/12/2024                         Would you like a call back once the refill request has been completed: [] Yes [] No    If the office needs to give you a call back, can they leave a voicemail: [] Yes [] No    Angela Miller MA  11/02/23, 10:55 EDT

## 2023-11-02 NOTE — PROGRESS NOTES
Subjective     Kim Amezquita is a 58 y.o.. female.     Cough  Associated symptoms include ear pain (clogged), headaches (at beginning, but not at this time), postnasal drip and a sore throat. Pertinent negatives include no fever.   URI   This is a new problem. Episode onset: 2 days. The problem has been unchanged. There has been no fever. Associated symptoms include congestion, coughing, ear pain (clogged), headaches (at beginning, but not at this time) and a sore throat. Pertinent negatives include no diarrhea, nausea or vomiting. Treatments tried: cough suppressant. The treatment provided no relief.   Sore Throat   Associated symptoms include congestion, coughing, ear pain (clogged) and headaches (at beginning, but not at this time). Pertinent negatives include no diarrhea or vomiting.       The following portions of the patient's history were reviewed and updated as appropriate: allergies, current medications, past family history, past medical history, past social history, past surgical history and problem list.    Past Medical History:   Diagnosis Date    Schmitz's esophagus     declines medication    Breast cyst     DDD (degenerative disc disease), cervical     Deviated septum     Disc degeneration, lumbar     Dry mouth     GERD (gastroesophageal reflux disease)     Herniated disc, cervical     C5-C6, 10/2006, Dr. Maykel Manzano    IBS (irritable bowel syndrome)     Kidney stones     Migraines     Ovarian cyst     biopsy done 8/2010 negative, Dr. Stacy Thomas    Restless leg syndrome     TMJ (dislocation of temporomandibular joint)        Past Surgical History:   Procedure Laterality Date    CERVICAL DISCECTOMY LAMINECTOMY DECOMPRESSION POSTERIOR FUSION WITH INSTRUMENTATION      C5-C6, Dr. Pablo Pal    DILATATION AND CURETTAGE      3/2001    HERNIA REPAIR      1969 (4years old)    MENISCECTOMY Right     10/2006, Dr. Cyrus Grimaldo    REPLACEMENT TOTAL KNEE Right     9/2014, Dr. Guadalupe    REPLACEMENT  "TOTAL KNEE      2019       Review of Systems   Constitutional:  Negative for fever.   HENT:  Positive for congestion, ear pain (clogged), postnasal drip and sore throat.    Respiratory:  Positive for cough and chest tightness.    Cardiovascular: Negative.    Gastrointestinal: Negative.  Negative for diarrhea, nausea and vomiting.   Neurological:  Positive for headaches (at beginning, but not at this time).       Allergies   Allergen Reactions    Meperidine     Meperidine Hcl Other (See Comments)       Objective     Vitals:    11/02/23 0921   BP: 130/78   Pulse: 94   Resp: 16   Temp: 98.8 °F (37.1 °C)   SpO2: 97%   Weight: 122 kg (268 lb)   Height: 170.2 cm (67.01\")     Body mass index is 41.96 kg/m².    Physical Exam  Vitals reviewed.   Constitutional:       Appearance: She is well-developed.   HENT:      Head: Normocephalic and atraumatic.      Right Ear: Tympanic membrane normal. No middle ear effusion. Tympanic membrane is not erythematous.      Left Ear: Tympanic membrane normal.  No middle ear effusion. Tympanic membrane is not erythematous.      Nose: Nose normal. Congestion present.      Mouth/Throat:      Mouth: Mucous membranes are moist.      Pharynx: Oropharyngeal exudate (clear pnd) present. No pharyngeal swelling or posterior oropharyngeal erythema.   Eyes:      Conjunctiva/sclera: Conjunctivae normal.      Pupils: Pupils are equal, round, and reactive to light.   Cardiovascular:      Rate and Rhythm: Normal rate and regular rhythm.      Heart sounds: No murmur heard.  Pulmonary:      Effort: Pulmonary effort is normal. No accessory muscle usage or respiratory distress.      Breath sounds: Normal breath sounds. No stridor. No decreased breath sounds, wheezing, rhonchi or rales.   Musculoskeletal:         General: Normal range of motion.   Lymphadenopathy:      Cervical: Cervical adenopathy (shotty) present.   Skin:     General: Skin is warm and dry.   Neurological:      Mental Status: She is alert and " oriented to person, place, and time.      Motor: Tremor (increased from previous, affecting torso) present.      Comments: No shuffling with walking but slight sway of walk   Psychiatric:         Mood and Affect: Mood is anxious.           Current Outpatient Medications:     acetaminophen (TYLENOL) 500 MG tablet, Take 1 tablet by mouth Every 6 (Six) Hours As Needed for Mild Pain., Disp: 30 tablet, Rfl: 0    psyllium (METAMUCIL) 58.6 % packet, Take 1 packet by mouth Daily., Disp: , Rfl:     azelastine (ASTELIN) 0.1 % nasal spray, 1 spray into the nostril(s) as directed by provider 2 (Two) Times a Day for 14 days. Use in each nostril as directed, Disp: 1 each, Rfl: 0    cefdinir (OMNICEF) 300 MG capsule, Take 1 capsule by mouth 2 (Two) Times a Day for 7 days., Disp: 14 capsule, Rfl: 0    fluticasone (FLONASE) 50 MCG/ACT nasal spray, 2 sprays into the nostril(s) as directed by provider Daily for 14 days., Disp: 16 g, Rfl: 0    NON FORMULARY, OTC adrenal support--has ginseng and ashwagandha in it, Disp: , Rfl:     Recent Results (from the past 168 hour(s))   POC Rapid Strep A    Collection Time: 11/02/23  9:39 AM    Specimen: Swab   Result Value Ref Range    Rapid Strep A Screen Negative Negative, VALID, INVALID, Not Performed    Internal Control Passed Passed    Lot Number [id number]     Expiration Date 12-    POCT SARS-CoV-2 + Flu Antigen DANIELLE    Collection Time: 11/02/23  9:46 AM    Specimen: Swab   Result Value Ref Range    SARS Antigen Not Detected Not Detected, Presumptive Negative    Influenza A Antigen DANIELLE Not Detected Not Detected    Influenza B Antigen DANIELLE Not Detected Not Detected    Internal Control Passed Passed    Lot Number [id number]     Expiration Date 04-          Diagnoses and all orders for this visit:    1. Acute maxillary sinusitis, recurrence not specified (Primary)  -     fluticasone (FLONASE) 50 MCG/ACT nasal spray; 2 sprays into the nostril(s) as directed by provider Daily for 14  days.  Dispense: 16 g; Refill: 0  -     azelastine (ASTELIN) 0.1 % nasal spray; 1 spray into the nostril(s) as directed by provider 2 (Two) Times a Day for 14 days. Use in each nostril as directed  Dispense: 1 each; Refill: 0    2. Acute pharyngitis, unspecified etiology  -     POC Rapid Strep A    3. Lymphadenopathy  -     cefdinir (OMNICEF) 300 MG capsule; Take 1 capsule by mouth 2 (Two) Times a Day for 7 days.  Dispense: 14 capsule; Refill: 0    4. Cough, unspecified type  -     POCT SARS-CoV-2 + Flu Antigen DANIELLE    5. Coarse tremors  Comments:  believe worsened by her otc medication that has ginseng and ashwagandha; recommend pt to stop otc medication        Patient Instructions   Drink plenty of fluids-water preferably, eat a heart healthy diet, get plenty of sleep and do warm salt water gargles twice a day until feeling better    Return if symptoms worsen or fail to improve.

## 2024-01-05 ENCOUNTER — APPOINTMENT (OUTPATIENT)
Dept: CT IMAGING | Facility: HOSPITAL | Age: 59
End: 2024-01-05
Payer: COMMERCIAL

## 2024-01-05 ENCOUNTER — HOSPITAL ENCOUNTER (EMERGENCY)
Facility: HOSPITAL | Age: 59
Discharge: HOME | End: 2024-01-05
Attending: EMERGENCY MEDICINE
Payer: COMMERCIAL

## 2024-01-05 VITALS
TEMPERATURE: 97.6 F | HEIGHT: 68 IN | DIASTOLIC BLOOD PRESSURE: 91 MMHG | RESPIRATION RATE: 18 BRPM | HEART RATE: 77 BPM | BODY MASS INDEX: 39.4 KG/M2 | SYSTOLIC BLOOD PRESSURE: 130 MMHG | WEIGHT: 260 LBS | OXYGEN SATURATION: 98 %

## 2024-01-05 DIAGNOSIS — N20.0 KIDNEY STONE ON RIGHT SIDE: Primary | ICD-10-CM

## 2024-01-05 LAB
ALBUMIN SERPL-MCNC: 4.6 G/DL (ref 3.5–5.2)
ALBUMIN/GLOB SERPL: 1.7 G/DL
ALP SERPL-CCNC: 95 U/L (ref 39–117)
ALT SERPL W P-5'-P-CCNC: 23 U/L (ref 1–33)
ANION GAP SERPL CALCULATED.3IONS-SCNC: 10.3 MMOL/L (ref 5–15)
AST SERPL-CCNC: 16 U/L (ref 1–32)
BACTERIA UR QL AUTO: ABNORMAL /HPF
BASOPHILS # BLD AUTO: 0.03 10*3/MM3 (ref 0–0.2)
BASOPHILS NFR BLD AUTO: 0.5 % (ref 0–1.5)
BILIRUB SERPL-MCNC: 0.2 MG/DL (ref 0–1.2)
BILIRUB UR QL STRIP: NEGATIVE
BUN SERPL-MCNC: 13 MG/DL (ref 6–20)
BUN/CREAT SERPL: 15.7 (ref 7–25)
CALCIUM SPEC-SCNC: 8.9 MG/DL (ref 8.6–10.5)
CHLORIDE SERPL-SCNC: 105 MMOL/L (ref 98–107)
CLARITY UR: CLEAR
CO2 SERPL-SCNC: 25.7 MMOL/L (ref 22–29)
COLOR UR: YELLOW
CREAT SERPL-MCNC: 0.83 MG/DL (ref 0.57–1)
D-LACTATE SERPL-SCNC: 2.1 MMOL/L (ref 0.5–2)
D-LACTATE SERPL-SCNC: 2.2 MMOL/L (ref 0.5–2)
DEPRECATED RDW RBC AUTO: 45.5 FL (ref 37–54)
EGFRCR SERPLBLD CKD-EPI 2021: 81.8 ML/MIN/1.73
EOSINOPHIL # BLD AUTO: 0.07 10*3/MM3 (ref 0–0.4)
EOSINOPHIL NFR BLD AUTO: 1.1 % (ref 0.3–6.2)
ERYTHROCYTE [DISTWIDTH] IN BLOOD BY AUTOMATED COUNT: 12.8 % (ref 12.3–15.4)
GLOBULIN UR ELPH-MCNC: 2.7 GM/DL
GLUCOSE SERPL-MCNC: 108 MG/DL (ref 65–99)
GLUCOSE UR STRIP-MCNC: NEGATIVE MG/DL
HCT VFR BLD AUTO: 41.7 % (ref 34–46.6)
HGB BLD-MCNC: 13.4 G/DL (ref 12–15.9)
HGB UR QL STRIP.AUTO: ABNORMAL
HOLD SPECIMEN: NORMAL
HYALINE CASTS UR QL AUTO: ABNORMAL /LPF
IMM GRANULOCYTES # BLD AUTO: 0.01 10*3/MM3 (ref 0–0.05)
IMM GRANULOCYTES NFR BLD AUTO: 0.2 % (ref 0–0.5)
KETONES UR QL STRIP: NEGATIVE
LEUKOCYTE ESTERASE UR QL STRIP.AUTO: NEGATIVE
LIPASE SERPL-CCNC: 47 U/L (ref 13–60)
LYMPHOCYTES # BLD AUTO: 2.36 10*3/MM3 (ref 0.7–3.1)
LYMPHOCYTES NFR BLD AUTO: 36 % (ref 19.6–45.3)
MCH RBC QN AUTO: 30.5 PG (ref 26.6–33)
MCHC RBC AUTO-ENTMCNC: 32.1 G/DL (ref 31.5–35.7)
MCV RBC AUTO: 95 FL (ref 79–97)
MONOCYTES # BLD AUTO: 0.45 10*3/MM3 (ref 0.1–0.9)
MONOCYTES NFR BLD AUTO: 6.9 % (ref 5–12)
NEUTROPHILS NFR BLD AUTO: 3.64 10*3/MM3 (ref 1.7–7)
NEUTROPHILS NFR BLD AUTO: 55.3 % (ref 42.7–76)
NITRITE UR QL STRIP: NEGATIVE
PH UR STRIP.AUTO: 8.5 [PH] (ref 5–8)
PLATELET # BLD AUTO: 277 10*3/MM3 (ref 140–450)
PMV BLD AUTO: 9.1 FL (ref 6–12)
POTASSIUM SERPL-SCNC: 4.4 MMOL/L (ref 3.5–5.2)
PROT SERPL-MCNC: 7.3 G/DL (ref 6–8.5)
PROT UR QL STRIP: ABNORMAL
RBC # BLD AUTO: 4.39 10*6/MM3 (ref 3.77–5.28)
RBC # UR STRIP: ABNORMAL /HPF
REF LAB TEST METHOD: ABNORMAL
SODIUM SERPL-SCNC: 141 MMOL/L (ref 136–145)
SP GR UR STRIP: 1.02 (ref 1–1.03)
SQUAMOUS #/AREA URNS HPF: ABNORMAL /HPF
UROBILINOGEN UR QL STRIP: ABNORMAL
WBC # UR STRIP: ABNORMAL /HPF
WBC NRBC COR # BLD AUTO: 6.56 10*3/MM3 (ref 3.4–10.8)

## 2024-01-05 PROCEDURE — 25010000002 ONDANSETRON PER 1 MG: Performed by: EMERGENCY MEDICINE

## 2024-01-05 PROCEDURE — 83605 ASSAY OF LACTIC ACID: CPT | Performed by: EMERGENCY MEDICINE

## 2024-01-05 PROCEDURE — 99284 EMERGENCY DEPT VISIT MOD MDM: CPT

## 2024-01-05 PROCEDURE — 96361 HYDRATE IV INFUSION ADD-ON: CPT

## 2024-01-05 PROCEDURE — 83690 ASSAY OF LIPASE: CPT | Performed by: EMERGENCY MEDICINE

## 2024-01-05 PROCEDURE — 80053 COMPREHEN METABOLIC PANEL: CPT | Performed by: EMERGENCY MEDICINE

## 2024-01-05 PROCEDURE — 74176 CT ABD & PELVIS W/O CONTRAST: CPT

## 2024-01-05 PROCEDURE — 85025 COMPLETE CBC W/AUTO DIFF WBC: CPT | Performed by: EMERGENCY MEDICINE

## 2024-01-05 PROCEDURE — 25010000002 MORPHINE PER 10 MG: Performed by: EMERGENCY MEDICINE

## 2024-01-05 PROCEDURE — 25810000003 LACTATED RINGERS PER 1000 ML: Performed by: EMERGENCY MEDICINE

## 2024-01-05 PROCEDURE — 96374 THER/PROPH/DIAG INJ IV PUSH: CPT

## 2024-01-05 PROCEDURE — 96375 TX/PRO/DX INJ NEW DRUG ADDON: CPT

## 2024-01-05 PROCEDURE — 81001 URINALYSIS AUTO W/SCOPE: CPT | Performed by: EMERGENCY MEDICINE

## 2024-01-05 RX ORDER — TAMSULOSIN HYDROCHLORIDE 0.4 MG/1
0.4 CAPSULE ORAL ONCE
Status: COMPLETED | OUTPATIENT
Start: 2024-01-05 | End: 2024-01-05

## 2024-01-05 RX ORDER — ONDANSETRON 2 MG/ML
4 INJECTION INTRAMUSCULAR; INTRAVENOUS ONCE
Status: COMPLETED | OUTPATIENT
Start: 2024-01-05 | End: 2024-01-05

## 2024-01-05 RX ORDER — MORPHINE SULFATE 4 MG/ML
4 INJECTION, SOLUTION INTRAMUSCULAR; INTRAVENOUS ONCE
Status: COMPLETED | OUTPATIENT
Start: 2024-01-05 | End: 2024-01-05

## 2024-01-05 RX ORDER — IBUPROFEN 600 MG/1
600 TABLET ORAL EVERY 6 HOURS PRN
Qty: 30 TABLET | Refills: 0 | Status: SHIPPED | OUTPATIENT
Start: 2024-01-05

## 2024-01-05 RX ORDER — HYDROCODONE BITARTRATE AND ACETAMINOPHEN 5; 325 MG/1; MG/1
1 TABLET ORAL EVERY 6 HOURS PRN
Qty: 12 TABLET | Refills: 0 | Status: SHIPPED | OUTPATIENT
Start: 2024-01-05

## 2024-01-05 RX ORDER — HYDROCODONE BITARTRATE AND ACETAMINOPHEN 5; 325 MG/1; MG/1
1 TABLET ORAL ONCE
Status: COMPLETED | OUTPATIENT
Start: 2024-01-05 | End: 2024-01-05

## 2024-01-05 RX ORDER — SODIUM CHLORIDE, SODIUM LACTATE, POTASSIUM CHLORIDE, CALCIUM CHLORIDE 600; 310; 30; 20 MG/100ML; MG/100ML; MG/100ML; MG/100ML
1000 INJECTION, SOLUTION INTRAVENOUS ONCE
Status: COMPLETED | OUTPATIENT
Start: 2024-01-05 | End: 2024-01-05

## 2024-01-05 RX ORDER — ONDANSETRON 4 MG/1
4 TABLET, ORALLY DISINTEGRATING ORAL EVERY 6 HOURS PRN
Qty: 30 TABLET | Refills: 0 | Status: SHIPPED | OUTPATIENT
Start: 2024-01-05

## 2024-01-05 RX ORDER — TAMSULOSIN HYDROCHLORIDE 0.4 MG/1
1 CAPSULE ORAL DAILY
Qty: 10 CAPSULE | Refills: 0 | Status: SHIPPED | OUTPATIENT
Start: 2024-01-05

## 2024-01-05 RX ADMIN — SODIUM CHLORIDE, POTASSIUM CHLORIDE, SODIUM LACTATE AND CALCIUM CHLORIDE 1000 ML: 600; 310; 30; 20 INJECTION, SOLUTION INTRAVENOUS at 03:41

## 2024-01-05 RX ADMIN — ONDANSETRON HYDROCHLORIDE 4 MG: 2 INJECTION, SOLUTION INTRAMUSCULAR; INTRAVENOUS at 03:41

## 2024-01-05 RX ADMIN — TAMSULOSIN HYDROCHLORIDE 0.4 MG: 0.4 CAPSULE ORAL at 04:58

## 2024-01-05 RX ADMIN — MORPHINE SULFATE 4 MG: 4 INJECTION, SOLUTION INTRAMUSCULAR; INTRAVENOUS at 03:40

## 2024-01-05 RX ADMIN — HYDROCODONE BITARTRATE AND ACETAMINOPHEN 1 TABLET: 5; 325 TABLET ORAL at 04:58

## 2024-01-05 NOTE — Clinical Note
[facility]  [address]  [place] [id number]    Kim Amezquita was seen and treated in our emergency department on 1/5/2024.  She may return to work on 01/06/2024.         Thank you for choosing [facility].    Danna Salinas RN

## 2024-01-05 NOTE — FSED PROVIDER NOTE
Subjective   History of Present Illness  58-year-old female presents complaining of right flank pain and difficulty urinating.  Symptoms began 2 days ago.  She initially thought she might have a kidney stone.  Patient states since then she has also developed suprapubic pain and feels like she is having difficulty producing urine.  No fevers.  No dysuria or hematuria.  Patient has a history of both kidney stones and UTIs.  She has taken ibuprofen and naproxen at home without resolution.    History provided by:  Patient   used: No        Review of Systems   Constitutional: Negative.  Negative for fever.   Respiratory: Negative.  Negative for shortness of breath.    Cardiovascular: Negative.  Negative for chest pain.   Gastrointestinal:  Positive for abdominal pain and nausea. Negative for vomiting.   Genitourinary:  Positive for difficulty urinating and flank pain. Negative for dysuria.   All other systems reviewed and are negative.      Past Medical History:   Diagnosis Date    Schmitz's esophagus     declines medication    Breast cyst     DDD (degenerative disc disease), cervical     Deviated septum     Disc degeneration, lumbar     Dry mouth     GERD (gastroesophageal reflux disease)     Herniated disc, cervical     C5-C6, 10/2006, Dr. Maykel Manzano    IBS (irritable bowel syndrome)     Kidney stones     Migraines     Ovarian cyst     biopsy done 8/2010 negative, Dr. Stacy Thomas    Restless leg syndrome     Screening for diabetes mellitus 07/31/2019    TMJ (dislocation of temporomandibular joint)        Allergies   Allergen Reactions    Meperidine     Meperidine Hcl Other (See Comments)       Past Surgical History:   Procedure Laterality Date    CERVICAL DISCECTOMY LAMINECTOMY DECOMPRESSION POSTERIOR FUSION WITH INSTRUMENTATION      C5-C6, Dr. Pablo Pal    DILATATION AND CURETTAGE      3/2001    HERNIA REPAIR      1969 (4years old)    MENISCECTOMY Right     10/2006, Dr. Cyrus Grimaldo     REPLACEMENT TOTAL KNEE Right     9/2014, Dr. Guadalupe    REPLACEMENT TOTAL KNEE      2019       Family History   Problem Relation Age of Onset    Hypertension Mother     Hyperlipidemia Mother     Heart disease Mother     Heart attack Mother     No Known Problems Father     Hyperlipidemia Sister     Hypertension Sister     Heart attack Maternal Uncle     Heart disease Maternal Uncle     Breast cancer Neg Hx        Social History     Socioeconomic History    Marital status: Single   Tobacco Use    Smoking status: Never    Smokeless tobacco: Never   Substance and Sexual Activity    Alcohol use: No    Drug use: No    Sexual activity: Yes     Partners: Male           Objective   Physical Exam  Vitals and nursing note reviewed.   Constitutional:       General: She is in acute distress.      Appearance: She is not ill-appearing or diaphoretic.   HENT:      Head: Normocephalic and atraumatic.      Right Ear: External ear normal.      Left Ear: External ear normal.      Nose: Nose normal.   Eyes:      Pupils: Pupils are equal, round, and reactive to light.   Cardiovascular:      Rate and Rhythm: Normal rate and regular rhythm.      Heart sounds: Normal heart sounds.   Pulmonary:      Effort: Pulmonary effort is normal. No respiratory distress.      Breath sounds: Normal breath sounds.   Abdominal:      Palpations: Abdomen is soft.      Tenderness: There is abdominal tenderness. There is right CVA tenderness.   Musculoskeletal:         General: No swelling or tenderness. Normal range of motion.      Cervical back: Normal range of motion and neck supple.   Skin:     General: Skin is warm and dry.      Findings: No rash.   Neurological:      General: No focal deficit present.      Mental Status: She is alert and oriented to person, place, and time.   Psychiatric:         Mood and Affect: Mood is anxious.         Behavior: Behavior normal.         Procedures           ED Course                                   NYDIA reviewed by  Milton Kurtz MD       Medical Decision Making  Number and Complexity of Problems  Differential Diagnosis: kidney stone, UTI, appendicitis, diverticulitis    MDM Data  My Laboratory interpretation: CBC and chemistry unremarkable, UA without signs of infection, borderline but stable lactic acid  My Radiology interpretation: 3mm obstructing stone    Treatment and Disposition  ED Course: 58-year-old female presenting with symptoms as described.  She was quite distressed on arrival but has had complete resolution in her symptoms with therapy here.  Workup revealed a 3 mm obstructing stone on the right.  Patient's renal function is intact.  I see no sign of infection with no fever, no leukocytosis, no nitrites or LE in the urine.  Though her lactic acid was minimally elevated, I believe this was related to her severe discomfort and I am clinically not concerned for sepsis.  Patient wishes to go home at this time and I see no indication for admission or further workup today.  Will continue pain and nausea medications, plus NSAIDs and tamsulosin.  Strict return precautions and close PCP & urology follow-up were discussed.  Patient was very happy with the plan.    Shared decision making: Discharge    Amount and/or Complexity of Data Reviewed  Labs: ordered.  Radiology: ordered.    Risk  OTC drugs.  Prescription drug management.  Parenteral controlled substances.        Final diagnoses:   Kidney stone on right side       ED Disposition  ED Disposition       ED Disposition   Discharge    Condition   Stable    Comment   --               Amanda Torres, APRN  [address] [facility] [id number] [phone]    Schedule an appointment as soon as possible for a visit       [facility]  [address] [facility] [address] [phone]  Schedule an appointment as soon as possible for a visit            Medication List        New Prescriptions      HYDROcodone-acetaminophen 5-325 MG per  tablet  Commonly known as: NORCO  Take 1 tablet by mouth Every 6 (Six) Hours As Needed for Moderate Pain or Severe Pain.     ibuprofen 600 MG tablet  Commonly known as: ADVIL,MOTRIN  Take 1 tablet by mouth Every 6 (Six) Hours As Needed for Mild Pain or Moderate Pain.     ondansetron ODT 4 MG disintegrating tablet  Commonly known as: ZOFRAN-ODT  Place 1 tablet on the tongue Every 6 (Six) Hours As Needed for Vomiting or Nausea.     tamsulosin 0.4 MG capsule 24 hr capsule  Commonly known as: FLOMAX  Take 1 capsule by mouth Daily.               Where to Get Your Medications        These medications were sent to [id number] - [place], [place] - [address] AT [facility] - [phone]  - [phone]   [address], [facility] [address]      Phone: [phone]   HYDROcodone-acetaminophen 5-325 MG per tablet  ibuprofen 600 MG tablet  ondansetron ODT 4 MG disintegrating tablet  tamsulosin 0.4 MG capsule 24 hr capsule

## 2024-04-09 DIAGNOSIS — E55.9 VITAMIN D DEFICIENCY: ICD-10-CM

## 2024-04-09 DIAGNOSIS — E78.2 MIXED HYPERLIPIDEMIA: Primary | ICD-10-CM

## 2024-04-10 LAB
25(OH)D3+25(OH)D2 SERPL-MCNC: 77.6 NG/ML (ref 30–100)
ALBUMIN SERPL-MCNC: 4.4 G/DL (ref 3.5–5.2)
ALBUMIN/GLOB SERPL: 1.7 G/DL
ALP SERPL-CCNC: 96 U/L (ref 39–117)
ALT SERPL-CCNC: 16 U/L (ref 1–33)
APPEARANCE UR: CLEAR
AST SERPL-CCNC: 13 U/L (ref 1–32)
BACTERIA #/AREA URNS HPF: NORMAL /HPF
BASOPHILS # BLD AUTO: 0.02 10*3/MM3 (ref 0–0.2)
BASOPHILS NFR BLD AUTO: 0.4 % (ref 0–1.5)
BILIRUB SERPL-MCNC: 0.4 MG/DL (ref 0–1.2)
BILIRUB UR QL STRIP: NEGATIVE
BUN SERPL-MCNC: 14 MG/DL (ref 6–20)
BUN/CREAT SERPL: 18.7 (ref 7–25)
CALCIUM SERPL-MCNC: 9.2 MG/DL (ref 8.6–10.5)
CASTS URNS MICRO: NORMAL
CHLORIDE SERPL-SCNC: 102 MMOL/L (ref 98–107)
CHOLEST SERPL-MCNC: 206 MG/DL (ref 0–200)
CO2 SERPL-SCNC: 27.6 MMOL/L (ref 22–29)
COLOR UR: YELLOW
CREAT SERPL-MCNC: 0.75 MG/DL (ref 0.57–1)
EGFRCR SERPLBLD CKD-EPI 2021: 92.4 ML/MIN/1.73
EOSINOPHIL # BLD AUTO: 0.07 10*3/MM3 (ref 0–0.4)
EOSINOPHIL NFR BLD AUTO: 1.3 % (ref 0.3–6.2)
EPI CELLS #/AREA URNS HPF: NORMAL /HPF
ERYTHROCYTE [DISTWIDTH] IN BLOOD BY AUTOMATED COUNT: 12.6 % (ref 12.3–15.4)
GLOBULIN SER CALC-MCNC: 2.6 GM/DL
GLUCOSE SERPL-MCNC: 93 MG/DL (ref 65–99)
GLUCOSE UR QL STRIP: NEGATIVE
HCT VFR BLD AUTO: 38.8 % (ref 34–46.6)
HDLC SERPL-MCNC: 41 MG/DL (ref 40–60)
HGB BLD-MCNC: 12.9 G/DL (ref 12–15.9)
HGB UR QL STRIP: NEGATIVE
IMM GRANULOCYTES # BLD AUTO: 0.02 10*3/MM3 (ref 0–0.05)
IMM GRANULOCYTES NFR BLD AUTO: 0.4 % (ref 0–0.5)
KETONES UR QL STRIP: NEGATIVE
LDLC SERPL CALC-MCNC: 135 MG/DL (ref 0–100)
LDLC/HDLC SERPL: 3.2 {RATIO}
LEUKOCYTE ESTERASE UR QL STRIP: NEGATIVE
LYMPHOCYTES # BLD AUTO: 1.56 10*3/MM3 (ref 0.7–3.1)
LYMPHOCYTES NFR BLD AUTO: 28.2 % (ref 19.6–45.3)
MCH RBC QN AUTO: 30.6 PG (ref 26.6–33)
MCHC RBC AUTO-ENTMCNC: 33.2 G/DL (ref 31.5–35.7)
MCV RBC AUTO: 91.9 FL (ref 79–97)
MONOCYTES # BLD AUTO: 0.41 10*3/MM3 (ref 0.1–0.9)
MONOCYTES NFR BLD AUTO: 7.4 % (ref 5–12)
NEUTROPHILS # BLD AUTO: 3.45 10*3/MM3 (ref 1.7–7)
NEUTROPHILS NFR BLD AUTO: 62.3 % (ref 42.7–76)
NITRITE UR QL STRIP: NEGATIVE
NRBC BLD AUTO-RTO: 0 /100 WBC (ref 0–0.2)
PH UR STRIP: 6.5 [PH] (ref 5–8)
PLATELET # BLD AUTO: 285 10*3/MM3 (ref 140–450)
POTASSIUM SERPL-SCNC: 4.5 MMOL/L (ref 3.5–5.2)
PROT SERPL-MCNC: 7 G/DL (ref 6–8.5)
PROT UR QL STRIP: NEGATIVE
RBC # BLD AUTO: 4.22 10*6/MM3 (ref 3.77–5.28)
RBC #/AREA URNS HPF: NORMAL /HPF
SODIUM SERPL-SCNC: 137 MMOL/L (ref 136–145)
SP GR UR STRIP: 1.02 (ref 1–1.03)
TRIGL SERPL-MCNC: 169 MG/DL (ref 0–150)
UROBILINOGEN UR STRIP-MCNC: NORMAL MG/DL
VLDLC SERPL CALC-MCNC: 30 MG/DL (ref 5–40)
WBC # BLD AUTO: 5.53 10*3/MM3 (ref 3.4–10.8)
WBC #/AREA URNS HPF: NORMAL /HPF

## 2024-04-12 ENCOUNTER — OFFICE VISIT (OUTPATIENT)
Dept: FAMILY MEDICINE CLINIC | Facility: CLINIC | Age: 59
End: 2024-04-12
Payer: COMMERCIAL

## 2024-04-12 VITALS
DIASTOLIC BLOOD PRESSURE: 70 MMHG | RESPIRATION RATE: 16 BRPM | TEMPERATURE: 98.1 F | HEIGHT: 68 IN | BODY MASS INDEX: 40.77 KG/M2 | WEIGHT: 269 LBS | SYSTOLIC BLOOD PRESSURE: 118 MMHG | HEART RATE: 80 BPM | OXYGEN SATURATION: 96 %

## 2024-04-12 DIAGNOSIS — E78.2 MIXED HYPERLIPIDEMIA: Primary | ICD-10-CM

## 2024-04-12 DIAGNOSIS — G25.2 COARSE TREMORS: ICD-10-CM

## 2024-04-12 DIAGNOSIS — M19.031 PRIMARY OSTEOARTHRITIS OF RIGHT WRIST: ICD-10-CM

## 2024-04-12 DIAGNOSIS — N32.81 OAB (OVERACTIVE BLADDER): ICD-10-CM

## 2024-04-12 PROCEDURE — 99213 OFFICE O/P EST LOW 20 MIN: CPT | Performed by: NURSE PRACTITIONER

## 2024-04-12 RX ORDER — LANOLIN ALCOHOL/MO/W.PET/CERES
1000 CREAM (GRAM) TOPICAL DAILY
COMMUNITY

## 2024-04-12 NOTE — PROGRESS NOTES
Subjective     Kim Amezquita is a 58 y.o.. female.     Patient here today for follow up on hyperlipidemia, vitamin D def, anxiety and tremors    Patient stating she is eating more lean cuisines, soup and salads at lunch, drinking about 40 oz of water a day, and not exercising much. Patient stating her job is still real stressful. Patient stating she had kidney stone in January and saw urologist that also started her on Mybetriq 25 mg for OAB. Patient having recent sprain of right wrist and wearing brace.     Patient stating no change in tremors, same as previous 20+ years.           The following portions of the patient's history were reviewed and updated as appropriate: allergies, current medications, past family history, past medical history, past social history, past surgical history and problem list.    Past Medical History:   Diagnosis Date    Schmitz's esophagus     declines medication    Breast cyst     DDD (degenerative disc disease), cervical     Deviated septum     Disc degeneration, lumbar     Dry mouth     GERD (gastroesophageal reflux disease)     Herniated disc, cervical     C5-C6, 10/2006, Dr. Maykel Manzano    IBS (irritable bowel syndrome)     Kidney stones     Migraines     Ovarian cyst     biopsy done 8/2010 negative, Dr. Stacy Thomas    Restless leg syndrome     Screening for diabetes mellitus 07/31/2019    TMJ (dislocation of temporomandibular joint)        Past Surgical History:   Procedure Laterality Date    CERVICAL DISCECTOMY LAMINECTOMY DECOMPRESSION POSTERIOR FUSION WITH INSTRUMENTATION      C5-C6, Dr. Pablo Pal    DILATATION AND CURETTAGE      3/2001    HERNIA REPAIR      1969 (4years old)    MENISCECTOMY Right     10/2006, Dr. Cyrus Grimaldo    REPLACEMENT TOTAL KNEE Right     9/2014, Dr. Guadalupe    REPLACEMENT TOTAL KNEE      2019       Review of Systems   Constitutional: Negative.    Respiratory: Negative.     Cardiovascular: Negative.        No Known Allergies    Objective  "    Vitals:    04/12/24 0752   BP: 118/70   Pulse: 80   Resp: 16   Temp: 98.1 °F (36.7 °C)   SpO2: 96%   Weight: 122 kg (269 lb)   Height: 172.7 cm (67.99\")     Body mass index is 40.91 kg/m².    Physical Exam  Vitals reviewed.   HENT:      Head: Normocephalic.   Eyes:      Pupils: Pupils are equal, round, and reactive to light.   Neck:      Vascular: No carotid bruit.   Cardiovascular:      Rate and Rhythm: Normal rate and regular rhythm.   Pulmonary:      Effort: Pulmonary effort is normal.      Breath sounds: Normal breath sounds.   Musculoskeletal:         General: Normal range of motion.      Right lower leg: No edema.      Left lower leg: No edema.      Comments: tremors   Skin:     General: Skin is warm and dry.   Neurological:      Mental Status: She is alert and oriented to person, place, and time.           Current Outpatient Medications:     acetaminophen (TYLENOL) 500 MG tablet, Take 1 tablet by mouth Every 6 (Six) Hours As Needed for Mild Pain., Disp: 30 tablet, Rfl: 0    azelastine (ASTELIN) 0.1 % nasal spray, INSTILL 1 SPRAY IN EACH NOSTRIL AS DIRECTED TWICE DAILY FOR 14 DAYS, Disp: 90 mL, Rfl: 0    HYDROcodone-acetaminophen (NORCO) 5-325 MG per tablet, Take 1 tablet by mouth Every 6 (Six) Hours As Needed for Moderate Pain or Severe Pain., Disp: 12 tablet, Rfl: 0    ibuprofen (ADVIL,MOTRIN) 600 MG tablet, Take 1 tablet by mouth Every 6 (Six) Hours As Needed for Mild Pain or Moderate Pain., Disp: 30 tablet, Rfl: 0    ibuprofen (ADVIL,MOTRIN) 800 MG tablet, Take 1 tablet by mouth Every 8 (Eight) Hours As Needed for Moderate Pain or Mild Pain., Disp: 15 tablet, Rfl: 0    Mirabegron ER (MYRBETRIQ) 25 MG tablet sustained-release 24 hour 24 hr tablet, Take 1 tablet by mouth Daily., Disp: , Rfl:     NON FORMULARY, OTC adrenal support--has ginseng and ashwagandha in it, Disp: , Rfl:     ondansetron ODT (ZOFRAN-ODT) 4 MG disintegrating tablet, Place 1 tablet on the tongue Every 6 (Six) Hours As Needed for " Vomiting or Nausea., Disp: 30 tablet, Rfl: 0    psyllium (METAMUCIL) 58.6 % packet, Take 1 packet by mouth Daily., Disp: , Rfl:     tamsulosin (FLOMAX) 0.4 MG capsule 24 hr capsule, Take 1 capsule by mouth Daily., Disp: 10 capsule, Rfl: 0    vitamin B-12 (CYANOCOBALAMIN) 1000 MCG tablet, Take 1 tablet by mouth Daily., Disp: , Rfl:     vitamin D3 125 MCG (5000 UT) capsule capsule, Take 1 capsule by mouth Daily., Disp: , Rfl:     fluticasone (FLONASE) 50 MCG/ACT nasal spray, 2 sprays into the nostril(s) as directed by provider Daily for 14 days., Disp: 16 g, Rfl: 0    Recent Results (from the past 2016 hour(s))   CBC & Differential    Collection Time: 04/09/24  8:10 AM    Specimen: Blood   Result Value Ref Range    WBC 5.53 3.40 - 10.80 10*3/mm3    RBC 4.22 3.77 - 5.28 10*6/mm3    Hemoglobin 12.9 12.0 - 15.9 g/dL    Hematocrit 38.8 34.0 - 46.6 %    MCV 91.9 79.0 - 97.0 fL    MCH 30.6 26.6 - 33.0 pg    MCHC 33.2 31.5 - 35.7 g/dL    RDW 12.6 12.3 - 15.4 %    Platelets 285 140 - 450 10*3/mm3    Neutrophil Rel % 62.3 42.7 - 76.0 %    Lymphocyte Rel % 28.2 19.6 - 45.3 %    Monocyte Rel % 7.4 5.0 - 12.0 %    Eosinophil Rel % 1.3 0.3 - 6.2 %    Basophil Rel % 0.4 0.0 - 1.5 %    Neutrophils Absolute 3.45 1.70 - 7.00 10*3/mm3    Lymphocytes Absolute 1.56 0.70 - 3.10 10*3/mm3    Monocytes Absolute 0.41 0.10 - 0.90 10*3/mm3    Eosinophils Absolute 0.07 0.00 - 0.40 10*3/mm3    Basophils Absolute 0.02 0.00 - 0.20 10*3/mm3    Immature Granulocyte Rel % 0.4 0.0 - 0.5 %    Immature Grans Absolute 0.02 0.00 - 0.05 10*3/mm3    nRBC 0.0 0.0 - 0.2 /100 WBC   Comprehensive Metabolic Panel    Collection Time: 04/09/24  8:10 AM    Specimen: Blood   Result Value Ref Range    Glucose 93 65 - 99 mg/dL    BUN 14 6 - 20 mg/dL    Creatinine 0.75 0.57 - 1.00 mg/dL    EGFR Result 92.4 >60.0 mL/min/1.73    BUN/Creatinine Ratio 18.7 7.0 - 25.0    Sodium 137 136 - 145 mmol/L    Potassium 4.5 3.5 - 5.2 mmol/L    Chloride 102 98 - 107 mmol/L    Total  CO2 27.6 22.0 - 29.0 mmol/L    Calcium 9.2 8.6 - 10.5 mg/dL    Total Protein 7.0 6.0 - 8.5 g/dL    Albumin 4.4 3.5 - 5.2 g/dL    Globulin 2.6 gm/dL    A/G Ratio 1.7 g/dL    Total Bilirubin 0.4 0.0 - 1.2 mg/dL    Alkaline Phosphatase 96 39 - 117 U/L    AST (SGOT) 13 1 - 32 U/L    ALT (SGPT) 16 1 - 33 U/L   Lipid Panel With LDL / HDL Ratio    Collection Time: 04/09/24  8:10 AM    Specimen: Blood   Result Value Ref Range    Total Cholesterol 206 (H) 0 - 200 mg/dL    Triglycerides 169 (H) 0 - 150 mg/dL    HDL Cholesterol 41 40 - 60 mg/dL    VLDL Cholesterol Get 30 5 - 40 mg/dL    LDL Chol Calc (NIH) 135 (H) 0 - 100 mg/dL    LDL/HDL RATIO 3.20    Urinalysis With Microscopic - Urine, Clean Catch    Collection Time: 04/09/24  8:10 AM    Specimen: Urine, Clean Catch   Result Value Ref Range    Specific Gravity, UA 1.021 1.005 - 1.030    pH, UA 6.5 5.0 - 8.0    Color, UA Yellow     Appearance, UA Clear Clear    Leukocytes, UA Negative Negative    Protein Negative Negative    Glucose, UA Negative Negative    Ketones Negative Negative    Blood, UA Negative Negative    Bilirubin, UA Negative Negative    Urobilinogen, UA Comment     Nitrite, UA Negative Negative   Vitamin D,25-Hydroxy    Collection Time: 04/09/24  8:10 AM    Specimen: Blood   Result Value Ref Range    25 Hydroxy, Vitamin D 77.6 30.0 - 100.0 ng/mL   Microscopic Examination -    Collection Time: 04/09/24  8:10 AM   Result Value Ref Range    WBC, UA 0-2 /HPF    RBC, UA 0-2 /HPF    Epithelial Cells (non renal) 0-2 /HPF    Cast Type Comment     Bacteria, UA Comment None Seen /HPF       XR Wrist 3+ View Right  XR WRIST 3+ VW RIGHT-3/25/2024     HISTORY: Fell, right wrist injury with pain.     There is mild degenerative arthritis of the first carpal metacarpal  joint.     No fractures, dislocations or other acute bony abnormalities are seen.        This report was finalized on 3/27/2024 10:40 AM by Dr. Jermaine Carvalho M.D on Workstation: [email or web address]            Diagnoses and all orders for this visit:    1. Mixed hyperlipidemia (Primary)    2. Coarse tremors    3. OAB (overactive bladder)    4. Primary osteoarthritis of right wrist        Patient Instructions   Hyperlipidemia: Patient refused statins; Patient stating she will consider Zetia and/or Repatha. Patient to continue to work on eating a heart healthy low fat low sugar diet. Patient to continue to work on drinking more water with goal of 64 oz a day. Patient to work on increasing activity outside of work. Patient to call office or send my chart message on medications so that one can be prescribed to take care of the hyperlipidemia. Patient verb. Understanding. Recheck in 6 months.     Tremors: Patient declines neurology. No increase and/or worsening of tremors. Continue to monitor    OAB: continue Mybetriq and follow up with urology.    OA of wrist: may use otc voltaren gel prn; continue to use wrist brace as needed. Do ROM exercises daily.     Return for fasting labs in 6 months, Annual physical.    Answers submitted by the patient for this visit:  Other (Submitted on 4/7/2024)  Please describe your symptoms.: Follow up  Have you had these symptoms before?: No  How long have you been having these symptoms?: Greater than 2 weeks  Please list any medications you are currently taking for this condition.: None  Primary Reason for Visit (Submitted on 4/7/2024)  What is the primary reason for your visit?: Other

## 2024-04-12 NOTE — PATIENT INSTRUCTIONS
Hyperlipidemia: Patient refused statins; Patient stating she will consider Zetia and/or Repatha. Patient to continue to work on eating a heart healthy low fat low sugar diet. Patient to continue to work on drinking more water with goal of 64 oz a day. Patient to work on increasing activity outside of work. Patient to call office or send my chart message on medications so that one can be prescribed to take care of the hyperlipidemia. Patient verb. Understanding. Recheck in 6 months.     Tremors: Patient declines neurology. No increase and/or worsening of tremors. Continue to monitor    OAB: continue Mybetriq and follow up with urology.    OA of wrist: may use otc voltaren gel prn; continue to use wrist brace as needed. Do ROM exercises daily.

## 2024-10-03 DIAGNOSIS — E07.9 THYROID DYSFUNCTION: ICD-10-CM

## 2024-10-03 DIAGNOSIS — E55.9 VITAMIN D DEFICIENCY: ICD-10-CM

## 2024-10-03 DIAGNOSIS — E78.2 MIXED HYPERLIPIDEMIA: Primary | ICD-10-CM

## 2024-10-05 LAB
25(OH)D3+25(OH)D2 SERPL-MCNC: 46.5 NG/ML (ref 30–100)
ALBUMIN SERPL-MCNC: 4.4 G/DL (ref 3.5–5.2)
ALBUMIN/GLOB SERPL: 1.5 G/DL
ALP SERPL-CCNC: 95 U/L (ref 39–117)
ALT SERPL-CCNC: 16 U/L (ref 1–33)
APPEARANCE UR: ABNORMAL
AST SERPL-CCNC: 18 U/L (ref 1–32)
BASOPHILS # BLD AUTO: 0.03 10*3/MM3 (ref 0–0.2)
BASOPHILS NFR BLD AUTO: 0.5 % (ref 0–1.5)
BILIRUB SERPL-MCNC: 0.4 MG/DL (ref 0–1.2)
BILIRUB UR QL STRIP: NEGATIVE
BUN SERPL-MCNC: 12 MG/DL (ref 6–20)
BUN/CREAT SERPL: 19.4 (ref 7–25)
CALCIUM SERPL-MCNC: 9.2 MG/DL (ref 8.6–10.5)
CHLORIDE SERPL-SCNC: 103 MMOL/L (ref 98–107)
CHOLEST SERPL-MCNC: 237 MG/DL (ref 0–200)
CO2 SERPL-SCNC: 24.9 MMOL/L (ref 22–29)
COLOR UR: YELLOW
CREAT SERPL-MCNC: 0.62 MG/DL (ref 0.57–1)
EGFRCR SERPLBLD CKD-EPI 2021: 102.7 ML/MIN/1.73
EOSINOPHIL # BLD AUTO: 0.08 10*3/MM3 (ref 0–0.4)
EOSINOPHIL NFR BLD AUTO: 1.5 % (ref 0.3–6.2)
ERYTHROCYTE [DISTWIDTH] IN BLOOD BY AUTOMATED COUNT: 12.8 % (ref 12.3–15.4)
GLOBULIN SER CALC-MCNC: 2.9 GM/DL
GLUCOSE SERPL-MCNC: 101 MG/DL (ref 65–99)
GLUCOSE UR QL STRIP: NEGATIVE
HCT VFR BLD AUTO: 39.3 % (ref 34–46.6)
HDLC SERPL-MCNC: 44 MG/DL (ref 40–60)
HGB BLD-MCNC: 13.1 G/DL (ref 12–15.9)
HGB UR QL STRIP: NEGATIVE
IMM GRANULOCYTES # BLD AUTO: 0.01 10*3/MM3 (ref 0–0.05)
IMM GRANULOCYTES NFR BLD AUTO: 0.2 % (ref 0–0.5)
KETONES UR QL STRIP: ABNORMAL
LDLC SERPL CALC-MCNC: 167 MG/DL (ref 0–100)
LDLC/HDLC SERPL: 3.73 {RATIO}
LEUKOCYTE ESTERASE UR QL STRIP: NEGATIVE
LYMPHOCYTES # BLD AUTO: 1.82 10*3/MM3 (ref 0.7–3.1)
LYMPHOCYTES NFR BLD AUTO: 33.3 % (ref 19.6–45.3)
MCH RBC QN AUTO: 31.5 PG (ref 26.6–33)
MCHC RBC AUTO-ENTMCNC: 33.3 G/DL (ref 31.5–35.7)
MCV RBC AUTO: 94.5 FL (ref 79–97)
MONOCYTES # BLD AUTO: 0.33 10*3/MM3 (ref 0.1–0.9)
MONOCYTES NFR BLD AUTO: 6 % (ref 5–12)
NEUTROPHILS # BLD AUTO: 3.2 10*3/MM3 (ref 1.7–7)
NEUTROPHILS NFR BLD AUTO: 58.5 % (ref 42.7–76)
NITRITE UR QL STRIP: NEGATIVE
NRBC BLD AUTO-RTO: 0 /100 WBC (ref 0–0.2)
PH UR STRIP: 5.5 [PH] (ref 5–8)
PLATELET # BLD AUTO: 297 10*3/MM3 (ref 140–450)
POTASSIUM SERPL-SCNC: 4.6 MMOL/L (ref 3.5–5.2)
PROT SERPL-MCNC: 7.3 G/DL (ref 6–8.5)
PROT UR QL STRIP: ABNORMAL
RBC # BLD AUTO: 4.16 10*6/MM3 (ref 3.77–5.28)
SODIUM SERPL-SCNC: 139 MMOL/L (ref 136–145)
SP GR UR STRIP: 1.03 (ref 1–1.03)
TRIGL SERPL-MCNC: 144 MG/DL (ref 0–150)
TSH SERPL DL<=0.005 MIU/L-ACNC: 1.91 UIU/ML (ref 0.27–4.2)
UROBILINOGEN UR STRIP-MCNC: ABNORMAL MG/DL
VLDLC SERPL CALC-MCNC: 26 MG/DL (ref 5–40)
WBC # BLD AUTO: 5.47 10*3/MM3 (ref 3.4–10.8)

## 2024-10-09 LAB
APO B SERPL-MCNC: 133 MG/DL
WRITTEN AUTHORIZATION: NORMAL

## 2024-10-23 ENCOUNTER — OFFICE VISIT (OUTPATIENT)
Dept: FAMILY MEDICINE CLINIC | Facility: CLINIC | Age: 59
End: 2024-10-23
Payer: COMMERCIAL

## 2024-10-23 VITALS
DIASTOLIC BLOOD PRESSURE: 82 MMHG | RESPIRATION RATE: 17 BRPM | WEIGHT: 268.9 LBS | SYSTOLIC BLOOD PRESSURE: 118 MMHG | HEIGHT: 68 IN | OXYGEN SATURATION: 100 % | HEART RATE: 65 BPM | TEMPERATURE: 98.5 F | BODY MASS INDEX: 40.75 KG/M2

## 2024-10-23 DIAGNOSIS — E78.2 MIXED HYPERLIPIDEMIA: ICD-10-CM

## 2024-10-23 DIAGNOSIS — F41.9 ANXIETY: ICD-10-CM

## 2024-10-23 DIAGNOSIS — N32.81 OAB (OVERACTIVE BLADDER): ICD-10-CM

## 2024-10-23 DIAGNOSIS — E66.01 CLASS 3 SEVERE OBESITY WITH SERIOUS COMORBIDITY AND BODY MASS INDEX (BMI) OF 40.0 TO 44.9 IN ADULT, UNSPECIFIED OBESITY TYPE: ICD-10-CM

## 2024-10-23 DIAGNOSIS — Z78.9 STATIN INTOLERANCE: ICD-10-CM

## 2024-10-23 DIAGNOSIS — E66.813 CLASS 3 SEVERE OBESITY WITH SERIOUS COMORBIDITY AND BODY MASS INDEX (BMI) OF 40.0 TO 44.9 IN ADULT, UNSPECIFIED OBESITY TYPE: ICD-10-CM

## 2024-10-23 DIAGNOSIS — G89.4 CHRONIC PAIN SYNDROME: ICD-10-CM

## 2024-10-23 DIAGNOSIS — I51.89 FAMILIAL HEART DISEASE: ICD-10-CM

## 2024-10-23 DIAGNOSIS — Z00.00 ANNUAL PHYSICAL EXAM: Primary | ICD-10-CM

## 2024-10-23 PROCEDURE — 99213 OFFICE O/P EST LOW 20 MIN: CPT | Performed by: NURSE PRACTITIONER

## 2024-10-23 PROCEDURE — 99396 PREV VISIT EST AGE 40-64: CPT | Performed by: NURSE PRACTITIONER

## 2024-10-23 RX ORDER — OXYBUTYNIN CHLORIDE 5 MG/1
5 TABLET ORAL
Qty: 30 TABLET | Refills: 2 | Status: SHIPPED | OUTPATIENT
Start: 2024-10-23

## 2024-10-23 NOTE — PROGRESS NOTES
"Subjective   Kim Amezquita is a 59 y.o. female. Patient is here today for   Chief Complaint   Patient presents with    Annual Exam    Results     Abnormal Labs: CMP, Lipid Panel, Apolipoprotein B, Urinalysis with Microscopic 10/04          Vitals:    10/23/24 0825   BP: 118/82   BP Location: Right arm   Patient Position: Sitting   Cuff Size: Adult   Pulse: 65   Resp: 17   Temp: 98.5 °F (36.9 °C)   TempSrc: Temporal   SpO2: 100%   Weight: 122 kg (268 lb 14.4 oz)   Height: 172.7 cm (67.99\")      Body mass index is 40.9 kg/m².    Class 3 Severe Obesity (BMI >=40). Obesity-related health conditions include the following: dyslipidemias and fatigue, arthralgias . Obesity is worsening. BMI is is above average; BMI management plan is completed. We discussed portion control and increasing exercise.     The following portions of the patient's history were reviewed and updated as appropriate: allergies, current medications, past family history, past medical history, past social history, past surgical history and problem list.    Past Medical History:   Diagnosis Date    Schmitz's esophagus     declines medication    Breast cyst     DDD (degenerative disc disease), cervical     Deviated septum     Disc degeneration, lumbar     Dry mouth     GERD (gastroesophageal reflux disease)     Herniated disc, cervical     C5-C6, 10/2006, Dr. Maykel Manzano    IBS (irritable bowel syndrome)     Kidney stones     Migraines     Ovarian cyst     biopsy done 8/2010 negative, Dr. Stacy Thomas    Restless leg syndrome     Screening for diabetes mellitus 07/31/2019    TMJ (dislocation of temporomandibular joint)       No Known Allergies   Social History     Socioeconomic History    Marital status: Single   Tobacco Use    Smoking status: Never     Passive exposure: Never    Smokeless tobacco: Never   Vaping Use    Vaping status: Never Used   Substance and Sexual Activity    Alcohol use: No    Drug use: No    Sexual activity: Yes     Partners: " Male        Current Outpatient Medications:     azelastine (ASTELIN) 0.1 % nasal spray, INSTILL 1 SPRAY IN EACH NOSTRIL AS DIRECTED TWICE DAILY FOR 14 DAYS, Disp: 90 mL, Rfl: 0    NON FORMULARY, OTC adrenal support--has ginseng and ashwagandha in it, Disp: , Rfl:     ondansetron ODT (ZOFRAN-ODT) 4 MG disintegrating tablet, Place 1 tablet on the tongue Every 6 (Six) Hours As Needed for Vomiting or Nausea., Disp: 30 tablet, Rfl: 0    psyllium (METAMUCIL) 58.6 % packet, Take 1 packet by mouth Daily., Disp: , Rfl:     vitamin B-12 (CYANOCOBALAMIN) 1000 MCG tablet, Take 1 tablet by mouth Daily., Disp: , Rfl:     vitamin D3 125 MCG (5000 UT) capsule capsule, Take 1 capsule by mouth Daily., Disp: , Rfl:     Evolocumab (REPATHA) solution prefilled syringe injection, Inject 1 mL under the skin into the appropriate area as directed Every 14 (Fourteen) Days., Disp: 6 mL, Rfl: 1    fluticasone (FLONASE) 50 MCG/ACT nasal spray, 2 sprays into the nostril(s) as directed by provider Daily for 14 days., Disp: 16 g, Rfl: 0    oxybutynin (DITROPAN) 5 MG tablet, Take 1 tablet by mouth Daily With Dinner., Disp: 30 tablet, Rfl: 2     Last Mammogram: 9/27/23 negative  GYN: none; declines referral    Last Cologaurd: 10/3/23 negative     ECG Report: 10/2/23 SR, vent rate 68    Objective   History of Present Illness  Patient here today for annual physical.     Kim Amezquita 59 y.o. female who presents for an Annual Wellness Visit.  she has a history of   Patient Active Problem List   Diagnosis    Abnormal menses    Allergic rhinitis    Anxiety    FOM (frequency of micturition)    Gonalgia    Affective disorder    Abnormal findings-skull or head    Patella-femoral syndrome    Sinusitis    Right calf pain    Tachycardia    Fatigue    Family history of thyroid disease    Screening for diabetes mellitus    Snoring    Vaginal itching    Schmitz's esophagus    Fibromyalgia    Migraine    Osteoarthritis    Right lumbar radiculopathy    IBS  (irritable bowel syndrome)    Thyroid dysfunction    Tremor    Mixed hyperlipidemia    Sleep disturbance    Vitamin D deficiency    OAB (overactive bladder)    Familial heart disease    Statin intolerance    Chronic pain syndrome    Class 3 severe obesity with serious comorbidity and body mass index (BMI) of 40.0 to 44.9 in adult   .  she has been doing well with new interval problems.  Labs results discussed in detail with the patient.  Plan to update vaccines if needed today.    Health Habits:  Dental Exam. up to date; June 2024  Eye Exam. not up to date - 2-3 years ago  Exercise: 0 times/week.  Current exercise activities include: none  Diet: weight watchers/lean cuisine two nights a week, fruit smoothy and bagel for dinner, salad/soup/fish for lunch  Water: 40 oz of water a day    Patient stating she has gotten a vibration plate to help with pain/discomforts to feet/legs/back and she feels it is helping her.     Preventative counseling  Discussed face to face the importance of healthy diet and exercise.     PHQ-9 Depression Screening  Little interest or pleasure in doing things? Not at all   Feeling down, depressed, or hopeless? Not at all   PHQ-2 Total Score 0   Trouble falling or staying asleep, or sleeping too much?     Feeling tired or having little energy?     Poor appetite or overeating?     Feeling bad about yourself - or that you are a failure or have let yourself or your family down?     Trouble concentrating on things, such as reading the newspaper or watching television?     Moving or speaking so slowly that other people could have noticed? Or the opposite - being so fidgety or restless that you have been moving around a lot more than usual?     Thoughts that you would be better off dead, or of hurting yourself in some way?     PHQ-9 Total Score     If you checked off any problems, how difficult have these problems made it for you to do your work, take care of things at home, or get along with other  people?         Recent Results (from the past 4 weeks)   Urinalysis With Microscopic If Indicated (No Culture) - Urine, Clean Catch    Collection Time: 10/04/24  8:01 AM    Specimen: Urine, Clean Catch   Result Value Ref Range    Specific Gravity, UA 1.029 1.005 - 1.030    pH, UA 5.5 5.0 - 8.0    Color, UA Yellow     Appearance, UA Turbid (A) Clear    Leukocytes, UA Negative Negative    Protein Trace (A) Negative    Glucose, UA Negative Negative    Ketones Trace (A) Negative    Blood, UA Negative Negative    Bilirubin, UA Negative Negative    Urobilinogen, UA Comment     Nitrite, UA Negative Negative   Lipid Panel With LDL / HDL Ratio    Collection Time: 10/04/24  8:01 AM    Specimen: Blood   Result Value Ref Range    Total Cholesterol 237 (H) 0 - 200 mg/dL    Triglycerides 144 0 - 150 mg/dL    HDL Cholesterol 44 40 - 60 mg/dL    VLDL Cholesterol Get 26 5 - 40 mg/dL    LDL Chol Calc (NIH) 167 (H) 0 - 100 mg/dL    LDL/HDL RATIO 3.73    Comprehensive Metabolic Panel    Collection Time: 10/04/24  8:01 AM    Specimen: Blood   Result Value Ref Range    Glucose 101 (H) 65 - 99 mg/dL    BUN 12 6 - 20 mg/dL    Creatinine 0.62 0.57 - 1.00 mg/dL    EGFR Result 102.7 >60.0 mL/min/1.73    BUN/Creatinine Ratio 19.4 7.0 - 25.0    Sodium 139 136 - 145 mmol/L    Potassium 4.6 3.5 - 5.2 mmol/L    Chloride 103 98 - 107 mmol/L    Total CO2 24.9 22.0 - 29.0 mmol/L    Calcium 9.2 8.6 - 10.5 mg/dL    Total Protein 7.3 6.0 - 8.5 g/dL    Albumin 4.4 3.5 - 5.2 g/dL    Globulin 2.9 gm/dL    A/G Ratio 1.5 g/dL    Total Bilirubin 0.4 0.0 - 1.2 mg/dL    Alkaline Phosphatase 95 39 - 117 U/L    AST (SGOT) 18 1 - 32 U/L    ALT (SGPT) 16 1 - 33 U/L   CBC & Differential    Collection Time: 10/04/24  8:01 AM    Specimen: Blood   Result Value Ref Range    WBC 5.47 3.40 - 10.80 10*3/mm3    RBC 4.16 3.77 - 5.28 10*6/mm3    Hemoglobin 13.1 12.0 - 15.9 g/dL    Hematocrit 39.3 34.0 - 46.6 %    MCV 94.5 79.0 - 97.0 fL    MCH 31.5 26.6 - 33.0 pg    MCHC 33.3  31.5 - 35.7 g/dL    RDW 12.8 12.3 - 15.4 %    Platelets 297 140 - 450 10*3/mm3    Neutrophil Rel % 58.5 42.7 - 76.0 %    Lymphocyte Rel % 33.3 19.6 - 45.3 %    Monocyte Rel % 6.0 5.0 - 12.0 %    Eosinophil Rel % 1.5 0.3 - 6.2 %    Basophil Rel % 0.5 0.0 - 1.5 %    Neutrophils Absolute 3.20 1.70 - 7.00 10*3/mm3    Lymphocytes Absolute 1.82 0.70 - 3.10 10*3/mm3    Monocytes Absolute 0.33 0.10 - 0.90 10*3/mm3    Eosinophils Absolute 0.08 0.00 - 0.40 10*3/mm3    Basophils Absolute 0.03 0.00 - 0.20 10*3/mm3    Immature Granulocyte Rel % 0.2 0.0 - 0.5 %    Immature Grans Absolute 0.01 0.00 - 0.05 10*3/mm3    nRBC 0.0 0.0 - 0.2 /100 WBC   Vitamin D,25-Hydroxy    Collection Time: 10/04/24  8:01 AM    Specimen: Blood   Result Value Ref Range    25 Hydroxy, Vitamin D 46.5 30.0 - 100.0 ng/ml   TSH Rfx On Abnormal To Free T4    Collection Time: 10/04/24  8:01 AM    Specimen: Blood   Result Value Ref Range    TSH 1.910 0.270 - 4.200 uIU/mL   Apolipoprotein B    Collection Time: 10/04/24  8:01 AM   Result Value Ref Range    Apolipoprotein B 133 (H) <90 mg/dL   Written Authorization    Collection Time: 10/04/24  8:01 AM   Result Value Ref Range    Written Authorization Comment         Review of Systems   Constitutional:  Positive for fatigue.   Respiratory: Negative.     Cardiovascular: Negative.    Gastrointestinal: Negative.    Genitourinary:  Positive for frequency. Negative for dysuria and urgency.   Musculoskeletal:  Positive for arthralgias.   Neurological:  Positive for tremors.        No changes in tremors, still consistent   Psychiatric/Behavioral:  Positive for dysphoric mood. Negative for sleep disturbance. The patient is nervous/anxious.         Related to her job       Physical Exam  Vitals reviewed.   Constitutional:       Appearance: Normal appearance. She is well-developed.   HENT:      Head: Normocephalic and atraumatic.      Right Ear: Tympanic membrane normal. Tympanic membrane is not erythematous.      Left  Ear: Tympanic membrane normal. Tympanic membrane is not erythematous.      Nose: Nose normal.   Eyes:      Conjunctiva/sclera: Conjunctivae normal.      Pupils: Pupils are equal, round, and reactive to light.   Neck:      Thyroid: No thyromegaly.      Vascular: No carotid bruit.      Trachea: No tracheal deviation.   Cardiovascular:      Rate and Rhythm: Normal rate and regular rhythm.      Pulses: Normal pulses.      Heart sounds: Normal heart sounds. No murmur heard.  Pulmonary:      Effort: No accessory muscle usage or respiratory distress.      Breath sounds: Normal breath sounds. No stridor. No decreased breath sounds, wheezing, rhonchi or rales.   Abdominal:      General: Bowel sounds are normal. There is no distension.      Palpations: Abdomen is soft. Abdomen is not rigid. There is no mass.      Tenderness: There is no abdominal tenderness. There is no guarding or rebound.      Hernia: No hernia is present.   Musculoskeletal:         General: Normal range of motion.      Cervical back: Normal range of motion and neck supple.   Lymphadenopathy:      Cervical: No cervical adenopathy.   Skin:     General: Skin is warm and dry.      Capillary Refill: Capillary refill takes less than 2 seconds.      Comments: Tremors noted to hands/arms   Neurological:      Mental Status: She is alert and oriented to person, place, and time.      Cranial Nerves: No cranial nerve deficit.      Sensory: No sensory deficit.      Motor: No abnormal muscle tone.      Coordination: Coordination normal.   Psychiatric:         Speech: Speech normal.         Behavior: Behavior normal.         ASSESSMENT       Problems Addressed this Visit          Allergies and Adverse Reactions    Statin intolerance    Relevant Medications    Evolocumab (REPATHA) solution prefilled syringe injection       Cardiac and Vasculature    Mixed hyperlipidemia    Relevant Medications    Evolocumab (REPATHA) solution prefilled syringe injection    Other Relevant  Orders    Lipid Panel       Endocrine and Metabolic    Class 3 severe obesity with serious comorbidity and body mass index (BMI) of 40.0 to 44.9 in adult       Family History    Familial heart disease    Relevant Medications    Evolocumab (REPATHA) solution prefilled syringe injection       Genitourinary and Reproductive     OAB (overactive bladder)    Relevant Medications    oxybutynin (DITROPAN) 5 MG tablet       Mental Health    Anxiety       Neuro    Chronic pain syndrome     Other Visit Diagnoses       Annual physical exam    -  Primary          Diagnoses         Codes Comments    Annual physical exam    -  Primary ICD-10-CM: Z00.00  ICD-9-CM: V70.0     Mixed hyperlipidemia     ICD-10-CM: E78.2  ICD-9-CM: 272.2     Anxiety     ICD-10-CM: F41.9  ICD-9-CM: 300.00     Class 3 severe obesity with serious comorbidity and body mass index (BMI) of 40.0 to 44.9 in adult, unspecified obesity type     ICD-10-CM: E66.813, E66.01, Z68.41  ICD-9-CM: 278.01, V85.41     Chronic pain syndrome     ICD-10-CM: G89.4  ICD-9-CM: 338.4     OAB (overactive bladder)     ICD-10-CM: N32.81  ICD-9-CM: 596.51     Statin intolerance     ICD-10-CM: Z78.9  ICD-9-CM: 995.27     Familial heart disease     ICD-10-CM: I51.89  ICD-9-CM: 429.89                 PLAN    Patient Instructions   Hyperlipidemia/statin intolerance/familial heart disease: uncontrolled and worsening, , Apolipoprotein B 133, Patient has failed statins due to side effects, Patient declined zetia, Patient declined other cholesterol medications, Patient agreeing to Repatha. Discussed medication and route of administration, given a discount card. recommended a cardiologist referral. Patient declined cardiology referral. Rechecking labs in 3 months with follow up to check on Repatha.     Anxiety: Patient stating all is related to  her job. Patient trying to find another job. Patient stating she enjoys spending time with her cat and working with [employer]. Patient encouraged  to make more time for herself. Patient recommended to get regular exercise and get plenty of sleep. National Suicide Prevention Lifeline 988. This is free, 24-hour help.    Obesity: recommend Patient to eat a heart healthy low fat low carb diet, drink plenty of water with goal 64 oz a day and start exercise routine 2-3 times a week, for more than 30 minutes at a time    Chronic pain syndrome: continue to use vibration plate as long as it helps with discomforts.     OAB: Patient having side effect of headache with mybetriq, did not tolerate Flomax. Sending over script for oxybutynin 5 mg nightly.     Follow up with dentist and optometrist when able  Always use seat belt when in vehicles    Return for fasting labs in 3 months, recheck in 3 months.

## 2024-10-23 NOTE — PATIENT INSTRUCTIONS
Hyperlipidemia/statin intolerance/familial heart disease: uncontrolled and worsening, , Apolipoprotein B 133, Patient has failed statins due to side effects, Patient declined zetia, Patient declined other cholesterol medications, Patient agreeing to Repatha. Discussed medication and route of administration, given a discount card. recommended a cardiologist referral. Patient declined cardiology referral. Rechecking labs in 3 months with follow up to check on Repatha.     Anxiety: Patient stating all is related to  her job. Patient trying to find another job. Patient stating she enjoys spending time with her cat and working with [employer]. Patient encouraged to make more time for herself. Patient recommended to get regular exercise and get plenty of sleep. National Suicide Prevention Lifeline 988. This is free, 24-hour help.    Obesity: recommend Patient to eat a heart healthy low fat low carb diet, drink plenty of water with goal 64 oz a day and start exercise routine 2-3 times a week, for more than 30 minutes at a time    Chronic pain syndrome: continue to use vibration plate as long as it helps with discomforts.     OAB: Patient having side effect of headache with mybetriq, did not tolerate Flomax. Sending over script for oxybutynin 5 mg nightly.     Follow up with dentist and optometrist when able  Always use seat belt when in vehicles

## 2024-10-30 DIAGNOSIS — Z78.9 STATIN INTOLERANCE: Primary | ICD-10-CM

## 2024-10-30 DIAGNOSIS — I51.89 FAMILIAL HEART DISEASE: ICD-10-CM

## 2024-10-30 DIAGNOSIS — E78.2 MIXED HYPERLIPIDEMIA: ICD-10-CM

## 2024-10-30 RX ORDER — BEMPEDOIC ACID 180 MG/1
180 TABLET, FILM COATED ORAL DAILY
Qty: 30 TABLET | Refills: 2 | Status: SHIPPED | OUTPATIENT
Start: 2024-10-30

## 2024-11-11 DIAGNOSIS — I51.89 FAMILIAL HEART DISEASE: ICD-10-CM

## 2024-11-11 DIAGNOSIS — Z78.9 STATIN INTOLERANCE: Primary | ICD-10-CM

## 2024-11-11 DIAGNOSIS — E78.2 MIXED HYPERLIPIDEMIA: ICD-10-CM

## 2024-11-11 RX ORDER — COLESEVELAM 180 1/1
1875 TABLET ORAL 2 TIMES DAILY WITH MEALS
Qty: 60 TABLET | Refills: 2 | Status: SHIPPED | OUTPATIENT
Start: 2024-11-11

## 2024-11-19 ENCOUNTER — TELEPHONE (OUTPATIENT)
Dept: FAMILY MEDICINE CLINIC | Facility: CLINIC | Age: 59
End: 2024-11-19
Payer: COMMERCIAL

## 2024-11-19 ENCOUNTER — TELEPHONE (OUTPATIENT)
Dept: FAMILY MEDICINE CLINIC | Facility: CLINIC | Age: 59
End: 2024-11-19

## 2024-11-19 NOTE — TELEPHONE ENCOUNTER
Hub staff attempted to follow warm transfer process and was unsuccessful     Caller:     Relationship to patient:     Best call back number: [phone]     Patient is needing: PATIENT MISSED A CALL FROM PCP ABOUT CHOLESTEROL AND MEDICATIONS

## 2024-11-22 DIAGNOSIS — E78.2 MIXED HYPERLIPIDEMIA: Primary | ICD-10-CM

## 2024-11-22 RX ORDER — EZETIMIBE 10 MG/1
10 TABLET ORAL DAILY
Qty: 90 TABLET | Refills: 1 | Status: SHIPPED | OUTPATIENT
Start: 2024-11-22

## 2025-01-16 DIAGNOSIS — E78.2 MIXED HYPERLIPIDEMIA: Primary | ICD-10-CM

## 2025-01-16 DIAGNOSIS — E66.01 CLASS 3 SEVERE OBESITY WITH SERIOUS COMORBIDITY AND BODY MASS INDEX (BMI) OF 40.0 TO 44.9 IN ADULT, UNSPECIFIED OBESITY TYPE: ICD-10-CM

## 2025-01-16 DIAGNOSIS — E55.9 VITAMIN D DEFICIENCY: ICD-10-CM

## 2025-01-16 DIAGNOSIS — R53.83 FATIGUE, UNSPECIFIED TYPE: ICD-10-CM

## 2025-01-16 DIAGNOSIS — E66.813 CLASS 3 SEVERE OBESITY WITH SERIOUS COMORBIDITY AND BODY MASS INDEX (BMI) OF 40.0 TO 44.9 IN ADULT, UNSPECIFIED OBESITY TYPE: ICD-10-CM

## 2025-01-18 LAB
25(OH)D3+25(OH)D2 SERPL-MCNC: 97 NG/ML (ref 30–100)
ALBUMIN SERPL-MCNC: 4.1 G/DL (ref 3.5–5.2)
ALBUMIN/GLOB SERPL: 1.3 G/DL
ALP SERPL-CCNC: 89 U/L (ref 39–117)
ALT SERPL-CCNC: 18 U/L (ref 1–33)
APPEARANCE UR: CLEAR
AST SERPL-CCNC: 17 U/L (ref 1–32)
BACTERIA #/AREA URNS HPF: NORMAL /HPF
BASOPHILS # BLD AUTO: 0.02 10*3/MM3 (ref 0–0.2)
BASOPHILS NFR BLD AUTO: 0.4 % (ref 0–1.5)
BILIRUB SERPL-MCNC: 0.4 MG/DL (ref 0–1.2)
BILIRUB UR QL STRIP: NEGATIVE
BUN SERPL-MCNC: 12 MG/DL (ref 6–20)
BUN/CREAT SERPL: 17.9 (ref 7–25)
CALCIUM SERPL-MCNC: 9.1 MG/DL (ref 8.6–10.5)
CASTS URNS MICRO: NORMAL
CHLORIDE SERPL-SCNC: 103 MMOL/L (ref 98–107)
CHOLEST SERPL-MCNC: 189 MG/DL (ref 0–200)
CO2 SERPL-SCNC: 27.2 MMOL/L (ref 22–29)
COLOR UR: YELLOW
CREAT SERPL-MCNC: 0.67 MG/DL (ref 0.57–1)
EGFRCR SERPLBLD CKD-EPI 2021: 100.8 ML/MIN/1.73
EOSINOPHIL # BLD AUTO: 0.07 10*3/MM3 (ref 0–0.4)
EOSINOPHIL NFR BLD AUTO: 1.4 % (ref 0.3–6.2)
EPI CELLS #/AREA URNS HPF: NORMAL /HPF
ERYTHROCYTE [DISTWIDTH] IN BLOOD BY AUTOMATED COUNT: 12.3 % (ref 12.3–15.4)
GLOBULIN SER CALC-MCNC: 3.1 GM/DL
GLUCOSE SERPL-MCNC: 108 MG/DL (ref 65–99)
GLUCOSE UR QL STRIP: NEGATIVE
HCT VFR BLD AUTO: 39.1 % (ref 34–46.6)
HDLC SERPL-MCNC: 42 MG/DL (ref 40–60)
HGB BLD-MCNC: 13.4 G/DL (ref 12–15.9)
HGB UR QL STRIP: NEGATIVE
IMM GRANULOCYTES # BLD AUTO: 0.02 10*3/MM3 (ref 0–0.05)
IMM GRANULOCYTES NFR BLD AUTO: 0.4 % (ref 0–0.5)
KETONES UR QL STRIP: NEGATIVE
LDLC SERPL CALC-MCNC: 119 MG/DL (ref 0–100)
LDLC/HDLC SERPL: 2.76 {RATIO}
LEUKOCYTE ESTERASE UR QL STRIP: ABNORMAL
LYMPHOCYTES # BLD AUTO: 1.51 10*3/MM3 (ref 0.7–3.1)
LYMPHOCYTES NFR BLD AUTO: 29.4 % (ref 19.6–45.3)
MCH RBC QN AUTO: 31.5 PG (ref 26.6–33)
MCHC RBC AUTO-ENTMCNC: 34.3 G/DL (ref 31.5–35.7)
MCV RBC AUTO: 91.8 FL (ref 79–97)
MONOCYTES # BLD AUTO: 0.36 10*3/MM3 (ref 0.1–0.9)
MONOCYTES NFR BLD AUTO: 7 % (ref 5–12)
NEUTROPHILS # BLD AUTO: 3.16 10*3/MM3 (ref 1.7–7)
NEUTROPHILS NFR BLD AUTO: 61.4 % (ref 42.7–76)
NITRITE UR QL STRIP: NEGATIVE
NRBC BLD AUTO-RTO: 0 /100 WBC (ref 0–0.2)
PH UR STRIP: 6.5 [PH] (ref 5–8)
PLATELET # BLD AUTO: 274 10*3/MM3 (ref 140–450)
POTASSIUM SERPL-SCNC: 4.3 MMOL/L (ref 3.5–5.2)
PROT SERPL-MCNC: 7.2 G/DL (ref 6–8.5)
PROT UR QL STRIP: NEGATIVE
RBC # BLD AUTO: 4.26 10*6/MM3 (ref 3.77–5.28)
RBC #/AREA URNS HPF: NORMAL /HPF
SODIUM SERPL-SCNC: 138 MMOL/L (ref 136–145)
SP GR UR STRIP: 1.02 (ref 1–1.03)
TRIGL SERPL-MCNC: 155 MG/DL (ref 0–150)
UROBILINOGEN UR STRIP-MCNC: ABNORMAL MG/DL
VIT B12 SERPL-MCNC: >2000 PG/ML (ref 211–946)
VLDLC SERPL CALC-MCNC: 28 MG/DL (ref 5–40)
WBC # BLD AUTO: 5.14 10*3/MM3 (ref 3.4–10.8)
WBC #/AREA URNS HPF: NORMAL /HPF

## 2025-01-24 ENCOUNTER — OFFICE VISIT (OUTPATIENT)
Dept: FAMILY MEDICINE CLINIC | Facility: CLINIC | Age: 60
End: 2025-01-24
Payer: COMMERCIAL

## 2025-01-24 VITALS
HEIGHT: 68 IN | TEMPERATURE: 98.6 F | WEIGHT: 264.6 LBS | OXYGEN SATURATION: 96 % | BODY MASS INDEX: 40.1 KG/M2 | SYSTOLIC BLOOD PRESSURE: 126 MMHG | DIASTOLIC BLOOD PRESSURE: 84 MMHG | RESPIRATION RATE: 16 BRPM | HEART RATE: 86 BPM

## 2025-01-24 DIAGNOSIS — E55.9 VITAMIN D DEFICIENCY: ICD-10-CM

## 2025-01-24 DIAGNOSIS — E78.2 MIXED HYPERLIPIDEMIA: Primary | ICD-10-CM

## 2025-01-24 DIAGNOSIS — R53.83 FATIGUE, UNSPECIFIED TYPE: ICD-10-CM

## 2025-01-24 DIAGNOSIS — F41.9 ANXIETY: ICD-10-CM

## 2025-01-24 PROCEDURE — 99213 OFFICE O/P EST LOW 20 MIN: CPT | Performed by: NURSE PRACTITIONER

## 2025-01-24 RX ORDER — MULTIPLE VITAMINS W/ MINERALS TAB 9MG-400MCG
1 TAB ORAL DAILY
COMMUNITY

## 2025-01-24 RX ORDER — EPINEPHRINE NASAL SOLUTION 1 MG/ML
0.5 SOLUTION NASAL AS NEEDED
COMMUNITY

## 2025-01-24 NOTE — PROGRESS NOTES
"Subjective     Kim Amezquita is a 59 y.o.. female.     Patient here today for follow up on hyperlipidemia. Patient has been taking Zetia 10 mg daily. Patient denies any side effects and/or adverse effects.           The following portions of the patient's history were reviewed and updated as appropriate: allergies, current medications, past family history, past medical history, past social history, past surgical history and problem list.    Past Medical History:   Diagnosis Date    Schmitz's esophagus     declines medication    Breast cyst     DDD (degenerative disc disease), cervical     Deviated septum     Disc degeneration, lumbar     Dry mouth     GERD (gastroesophageal reflux disease)     Herniated disc, cervical     C5-C6, 10/2006, Dr. Maykel Manzano    IBS (irritable bowel syndrome)     Kidney stones     Migraines     Ovarian cyst     biopsy done 8/2010 negative, Dr. Stacy Thomas    Restless leg syndrome     Screening for diabetes mellitus 07/31/2019    TMJ (dislocation of temporomandibular joint)        Past Surgical History:   Procedure Laterality Date    CERVICAL DISCECTOMY LAMINECTOMY DECOMPRESSION POSTERIOR FUSION WITH INSTRUMENTATION      C5-C6, Dr. Pablo Pal    DILATATION AND CURETTAGE      3/2001    HERNIA REPAIR      1969 (4years old)    MENISCECTOMY Right     10/2006, Dr. Cyrus Grimaldo    REPLACEMENT TOTAL KNEE Right     9/2014, Dr. Guadalupe    REPLACEMENT TOTAL KNEE      2019       Review of Systems   Constitutional: Negative.    Respiratory: Negative.     Cardiovascular: Negative.    Psychiatric/Behavioral:  Negative for sleep disturbance. The patient is not nervous/anxious.        No Known Allergies    Objective     Vitals:    01/24/25 1546   BP: 126/84   BP Location: Right arm   Patient Position: Sitting   Cuff Size: Adult   Pulse: 86   Resp: 16   Temp: 98.6 °F (37 °C)   TempSrc: Temporal   SpO2: 96%   Weight: 120 kg (264 lb 9.6 oz)   Height: 172.7 cm (67.99\")     Body mass index is 40.24 " kg/m².    Physical Exam  Vitals reviewed.   HENT:      Head: Normocephalic.   Eyes:      Pupils: Pupils are equal, round, and reactive to light.   Cardiovascular:      Rate and Rhythm: Normal rate and regular rhythm.      Pulses: Normal pulses.   Pulmonary:      Effort: Pulmonary effort is normal.      Breath sounds: Normal breath sounds.   Musculoskeletal:         General: Normal range of motion.   Skin:     General: Skin is warm and dry.   Neurological:      Mental Status: She is alert and oriented to person, place, and time.   Psychiatric:         Behavior: Behavior normal.           Current Outpatient Medications:     EPINEPHrine (ADRENALIN) 0.1 % nasal solution, Administer 0.5 mL into the nostril(s) as directed by provider As Needed., Disp: , Rfl:     ezetimibe (Zetia) 10 MG tablet, Take 1 tablet by mouth Daily., Disp: 90 tablet, Rfl: 1    multivitamin with minerals tablet tablet, Take 1 tablet by mouth Daily., Disp: , Rfl:     vitamin B-12 (CYANOCOBALAMIN) 1000 MCG tablet, Take 1 tablet by mouth Daily., Disp: , Rfl:     vitamin D3 125 MCG (5000 UT) capsule capsule, Take 1 capsule by mouth Daily., Disp: , Rfl:     fluticasone (FLONASE) 50 MCG/ACT nasal spray, 2 sprays into the nostril(s) as directed by provider Daily for 14 days., Disp: 16 g, Rfl: 0    oxybutynin (DITROPAN) 5 MG tablet, Take 1 tablet by mouth Daily With Dinner. (Patient not taking: Reported on 1/24/2025), Disp: 30 tablet, Rfl: 2    Recent Results (from the past 12 weeks)   CBC & Differential    Collection Time: 01/17/25  8:19 AM    Specimen: Blood   Result Value Ref Range    WBC 5.14 3.40 - 10.80 10*3/mm3    RBC 4.26 3.77 - 5.28 10*6/mm3    Hemoglobin 13.4 12.0 - 15.9 g/dL    Hematocrit 39.1 34.0 - 46.6 %    MCV 91.8 79.0 - 97.0 fL    MCH 31.5 26.6 - 33.0 pg    MCHC 34.3 31.5 - 35.7 g/dL    RDW 12.3 12.3 - 15.4 %    Platelets 274 140 - 450 10*3/mm3    Neutrophil Rel % 61.4 42.7 - 76.0 %    Lymphocyte Rel % 29.4 19.6 - 45.3 %    Monocyte Rel %  7.0 5.0 - 12.0 %    Eosinophil Rel % 1.4 0.3 - 6.2 %    Basophil Rel % 0.4 0.0 - 1.5 %    Neutrophils Absolute 3.16 1.70 - 7.00 10*3/mm3    Lymphocytes Absolute 1.51 0.70 - 3.10 10*3/mm3    Monocytes Absolute 0.36 0.10 - 0.90 10*3/mm3    Eosinophils Absolute 0.07 0.00 - 0.40 10*3/mm3    Basophils Absolute 0.02 0.00 - 0.20 10*3/mm3    Immature Granulocyte Rel % 0.4 0.0 - 0.5 %    Immature Grans Absolute 0.02 0.00 - 0.05 10*3/mm3    nRBC 0.0 0.0 - 0.2 /100 WBC   Comprehensive Metabolic Panel    Collection Time: 01/17/25  8:19 AM    Specimen: Blood   Result Value Ref Range    Glucose 108 (H) 65 - 99 mg/dL    BUN 12 6 - 20 mg/dL    Creatinine 0.67 0.57 - 1.00 mg/dL    EGFR Result 100.8 >60.0 mL/min/1.73    BUN/Creatinine Ratio 17.9 7.0 - 25.0    Sodium 138 136 - 145 mmol/L    Potassium 4.3 3.5 - 5.2 mmol/L    Chloride 103 98 - 107 mmol/L    Total CO2 27.2 22.0 - 29.0 mmol/L    Calcium 9.1 8.6 - 10.5 mg/dL    Total Protein 7.2 6.0 - 8.5 g/dL    Albumin 4.1 3.5 - 5.2 g/dL    Globulin 3.1 gm/dL    A/G Ratio 1.3 g/dL    Total Bilirubin 0.4 0.0 - 1.2 mg/dL    Alkaline Phosphatase 89 39 - 117 U/L    AST (SGOT) 17 1 - 32 U/L    ALT (SGPT) 18 1 - 33 U/L   Lipid Panel With LDL / HDL Ratio    Collection Time: 01/17/25  8:19 AM    Specimen: Blood   Result Value Ref Range    Total Cholesterol 189 0 - 200 mg/dL    Triglycerides 155 (H) 0 - 150 mg/dL    HDL Cholesterol 42 40 - 60 mg/dL    VLDL Cholesterol Get 28 5 - 40 mg/dL    LDL Chol Calc (NIH) 119 (H) 0 - 100 mg/dL    LDL/HDL RATIO 2.76    Urinalysis With Microscopic If Indicated (No Culture) - Urine, Clean Catch    Collection Time: 01/17/25  8:19 AM    Specimen: Urine, Clean Catch   Result Value Ref Range    Specific Gravity, UA 1.024 1.005 - 1.030    pH, UA 6.5 5.0 - 8.0    Color, UA Yellow     Appearance, UA Clear Clear    Leukocytes, UA Trace (A) Negative    Protein Negative Negative    Glucose, UA Negative Negative    Ketones Negative Negative    Blood, UA Negative Negative     Bilirubin, UA Negative Negative    Urobilinogen, UA Comment     Nitrite, UA Negative Negative   Vitamin B12    Collection Time: 01/17/25  8:19 AM    Specimen: Blood   Result Value Ref Range    Vitamin B-12 >2000 (H) 211 - 946 pg/mL   Vitamin D,25-Hydroxy    Collection Time: 01/17/25  8:19 AM    Specimen: Blood   Result Value Ref Range    25 Hydroxy, Vitamin D 97.0 30.0 - 100.0 ng/ml   Microscopic Examination -    Collection Time: 01/17/25  8:19 AM   Result Value Ref Range    WBC, UA 0-2 /HPF    RBC, UA 0-2 /HPF    Epithelial Cells (non renal) 0-2 /HPF    Cast Type Comment     Bacteria, UA Comment None Seen /HPF             Diagnoses and all orders for this visit:    1. Mixed hyperlipidemia (Primary)    2. Anxiety    3. Vitamin D deficiency    4. Fatigue, unspecified type            Patient Instructions   Hyperlipidemia: LDL went from 167 to 119, continue Zetia 10 mg daily; continue to work on eating a heart healthy low fat diet, drink plenty of water with goal 64 oz a day and exercise 3-5 times a week, for more than 30 minutes at a time    Anxiety: stable, continue to monitor    Vitamin D deficiency: wnl, continue to monitor    Fatigue: slight improvement noted, continue to monitor; continue with healthy lifestyle modifications.     Return for fasting labs, Annual physical.

## 2025-01-30 NOTE — PATIENT INSTRUCTIONS
Hyperlipidemia: LDL went from 167 to 119, continue Zetia 10 mg daily; continue to work on eating a heart healthy low fat diet, drink plenty of water with goal 64 oz a day and exercise 3-5 times a week, for more than 30 minutes at a time    Anxiety: stable, continue to monitor    Vitamin D deficiency: wnl, continue to monitor    Fatigue: slight improvement noted, continue to monitor; continue with healthy lifestyle modifications.

## 2025-02-12 DIAGNOSIS — E78.2 MIXED HYPERLIPIDEMIA: ICD-10-CM

## 2025-02-12 RX ORDER — EZETIMIBE 10 MG/1
10 TABLET ORAL DAILY
Qty: 90 TABLET | Refills: 1 | Status: SHIPPED | OUTPATIENT
Start: 2025-02-12

## 2025-07-03 ENCOUNTER — OFFICE VISIT (OUTPATIENT)
Dept: FAMILY MEDICINE CLINIC | Facility: CLINIC | Age: 60
End: 2025-07-03
Payer: COMMERCIAL

## 2025-07-03 VITALS
RESPIRATION RATE: 16 BRPM | HEIGHT: 68 IN | OXYGEN SATURATION: 97 % | DIASTOLIC BLOOD PRESSURE: 80 MMHG | HEART RATE: 91 BPM | SYSTOLIC BLOOD PRESSURE: 120 MMHG | WEIGHT: 263.9 LBS | BODY MASS INDEX: 39.99 KG/M2 | TEMPERATURE: 98.4 F

## 2025-07-03 DIAGNOSIS — J01.40 ACUTE PANSINUSITIS, RECURRENCE NOT SPECIFIED: Primary | ICD-10-CM

## 2025-07-03 DIAGNOSIS — R05.1 ACUTE COUGH: ICD-10-CM

## 2025-07-03 PROCEDURE — 99213 OFFICE O/P EST LOW 20 MIN: CPT | Performed by: NURSE PRACTITIONER

## 2025-07-03 RX ORDER — BENZONATATE 100 MG/1
100 CAPSULE ORAL 3 TIMES DAILY PRN
Qty: 30 CAPSULE | Refills: 0 | Status: SHIPPED | OUTPATIENT
Start: 2025-07-03

## 2025-07-03 RX ORDER — CEFDINIR 300 MG/1
300 CAPSULE ORAL 2 TIMES DAILY
Qty: 14 CAPSULE | Refills: 0 | Status: SHIPPED | OUTPATIENT
Start: 2025-07-03 | End: 2025-07-10

## 2025-07-03 RX ORDER — MULTIVIT WITH MINERALS/LUTEIN
250 TABLET ORAL DAILY
COMMUNITY

## 2025-07-03 NOTE — PATIENT INSTRUCTIONS
Drink plenty of fluids-water preferably, get plenty of sleep and do warm salt water gargles twice a day until feeling better.

## 2025-07-03 NOTE — PROGRESS NOTES
Subjective     Kim Amezquita is a 59 y.o.. female.     Headache    Associated symptoms: congestion, cough, ear pain, headaches, rhinorrhea and sore throat      Associated symptoms: no fever and no shortness of breath    URI   This is a new problem. Episode onset: 3 weeks. Associated symptoms include congestion, coughing, ear pain, headaches, rhinorrhea and a sore throat.       The following portions of the patient's history were reviewed and updated as appropriate: allergies, current medications, past family history, past medical history, past social history, past surgical history and problem list.    Past Medical History:   Diagnosis Date    Arthritis     Schmitz's esophagus     declines medication    Breast cyst     DDD (degenerative disc disease), cervical     Deviated septum     Disc degeneration, lumbar     Dry mouth     GERD (gastroesophageal reflux disease)     Herniated disc, cervical     C5-C6, 10/2006, Dr. Maykel Manzano    IBS (irritable bowel syndrome)     Kidney stones     Migraines     Ovarian cyst     biopsy done 8/2010 negative, Dr. Stacy Thomas    Restless leg syndrome     Screening for diabetes mellitus 07/31/2019    TMJ (dislocation of temporomandibular joint)        Past Surgical History:   Procedure Laterality Date    CERVICAL DISCECTOMY LAMINECTOMY DECOMPRESSION POSTERIOR FUSION WITH INSTRUMENTATION      C5-C6, Dr. Pablo Pal    DILATATION AND CURETTAGE      3/2001    HERNIA REPAIR      1969 (4years old)    MENISCECTOMY Right     10/2006, Dr. Cyrus Grimaldo    REPLACEMENT TOTAL KNEE Right     9/2014, Dr. Guadalupe    REPLACEMENT TOTAL KNEE      2019       Review of Systems   Constitutional:  Negative for fever.   HENT:  Positive for congestion, ear pain, postnasal drip, rhinorrhea and sore throat. Hearing loss: fullness.   Respiratory:  Positive for cough. Negative for shortness of breath.    Cardiovascular: Negative.    Gastrointestinal: Negative.    Neurological:  Positive for headaches.  "      No Known Allergies    Objective     Vitals:    07/03/25 1128   BP: 120/80   BP Location: Right arm   Patient Position: Sitting   Cuff Size: Large Adult   Pulse: 91   Resp: 16   Temp: 98.4 °F (36.9 °C)   TempSrc: Oral   SpO2: 97%   Weight: 120 kg (263 lb 14.4 oz)   Height: 172.7 cm (67.99\")     Body mass index is 40.14 kg/m².    Physical Exam  Vitals reviewed.   Constitutional:       Appearance: She is well-developed.   HENT:      Head: Normocephalic and atraumatic.      Right Ear: A middle ear effusion (clear fluid noted) is present. Tympanic membrane is not erythematous.      Left Ear: A middle ear effusion (clear fluid noted) is present. Tympanic membrane is not erythematous.      Nose:      Right Sinus: Maxillary sinus tenderness and frontal sinus tenderness present.      Left Sinus: Maxillary sinus tenderness and frontal sinus tenderness present.      Mouth/Throat:      Mouth: Mucous membranes are moist.      Pharynx: Postnasal drip present. No pharyngeal swelling, oropharyngeal exudate or posterior oropharyngeal erythema.   Eyes:      Conjunctiva/sclera: Conjunctivae normal.      Pupils: Pupils are equal, round, and reactive to light.   Cardiovascular:      Rate and Rhythm: Normal rate and regular rhythm.      Heart sounds: No murmur heard.  Pulmonary:      Effort: Pulmonary effort is normal.      Breath sounds: No wheezing, rhonchi or rales.   Musculoskeletal:         General: Normal range of motion.   Lymphadenopathy:      Cervical: Cervical adenopathy (shotty) present.   Skin:     General: Skin is warm and dry.   Neurological:      Mental Status: She is alert and oriented to person, place, and time.           Current Outpatient Medications:     EPINEPHrine (ADRENALIN) 0.1 % nasal solution, Administer 0.5 mL into the nostril(s) as directed by provider As Needed., Disp: , Rfl:     vitamin B-12 (CYANOCOBALAMIN) 1000 MCG tablet, Take 1 tablet by mouth Daily., Disp: , Rfl:     vitamin D3 125 MCG (5000 UT) " capsule capsule, Take 1 capsule by mouth Daily., Disp: , Rfl:     benzonatate (Tessalon Perles) 100 MG capsule, Take 1 capsule by mouth 3 (Three) Times a Day As Needed for Cough., Disp: 30 capsule, Rfl: 0    cefdinir (OMNICEF) 300 MG capsule, Take 1 capsule by mouth 2 (Two) Times a Day for 7 days., Disp: 14 capsule, Rfl: 0    ezetimibe (ZETIA) 10 MG tablet, TAKE 1 TABLET BY MOUTH DAILY, Disp: 90 tablet, Rfl: 1    fluticasone (FLONASE) 50 MCG/ACT nasal spray, 2 sprays into the nostril(s) as directed by provider Daily for 14 days., Disp: 16 g, Rfl: 0    multivitamin with minerals tablet tablet, Take 1 tablet by mouth Daily. (Patient not taking: Reported on 7/3/2025), Disp: , Rfl:     oxybutynin (DITROPAN) 5 MG tablet, Take 1 tablet by mouth Daily With Dinner. (Patient not taking: Reported on 7/3/2025), Disp: 30 tablet, Rfl: 2    vitamin C (ASCORBIC ACID) 250 MG tablet, Take 1 tablet by mouth Daily., Disp: , Rfl:     No results found for this or any previous visit (from the past 12 weeks).      Diagnoses and all orders for this visit:    1. Acute pansinusitis, recurrence not specified (Primary)  -     cefdinir (OMNICEF) 300 MG capsule; Take 1 capsule by mouth 2 (Two) Times a Day for 7 days.  Dispense: 14 capsule; Refill: 0    2. Acute cough  -     benzonatate (Tessalon Perles) 100 MG capsule; Take 1 capsule by mouth 3 (Three) Times a Day As Needed for Cough.  Dispense: 30 capsule; Refill: 0        Patient Instructions   Drink plenty of fluids-water preferably, get plenty of sleep and do warm salt water gargles twice a day until feeling better.     Return if symptoms worsen or fail to improve.